# Patient Record
Sex: MALE | Race: WHITE | NOT HISPANIC OR LATINO | Employment: FULL TIME | URBAN - METROPOLITAN AREA
[De-identification: names, ages, dates, MRNs, and addresses within clinical notes are randomized per-mention and may not be internally consistent; named-entity substitution may affect disease eponyms.]

---

## 2019-05-03 ENCOUNTER — HOSPITAL ENCOUNTER (OUTPATIENT)
Dept: MEDSURG UNIT | Facility: HOSPITAL | Age: 29
End: 2019-05-04
Attending: UROLOGY | Admitting: UROLOGY

## 2019-05-03 LAB
ABS NEUT (OLG): 3.83 X10(3)/MCL (ref 2.1–9.2)
ALBUMIN SERPL-MCNC: 4.1 GM/DL (ref 3.4–5)
ALBUMIN/GLOB SERPL: 1.2 RATIO (ref 1.1–2)
ALP SERPL-CCNC: 89 UNIT/L (ref 50–136)
ALT SERPL-CCNC: 27 UNIT/L (ref 12–78)
APPEARANCE, UA: CLEAR
APTT PPP: 30.4 SECOND(S) (ref 24.2–33.9)
AST SERPL-CCNC: 20 UNIT/L (ref 15–37)
BACTERIA SPEC CULT: ABNORMAL /HPF
BASOPHILS # BLD AUTO: 0.1 X10(3)/MCL (ref 0–0.2)
BASOPHILS NFR BLD AUTO: 1 %
BILIRUB SERPL-MCNC: 0.9 MG/DL (ref 0.2–1)
BILIRUB UR QL STRIP: NEGATIVE
BILIRUBIN DIRECT+TOT PNL SERPL-MCNC: 0.1 MG/DL (ref 0–0.5)
BILIRUBIN DIRECT+TOT PNL SERPL-MCNC: 0.8 MG/DL (ref 0–0.8)
BUN SERPL-MCNC: 7 MG/DL (ref 7–18)
CALCIUM SERPL-MCNC: 9 MG/DL (ref 8.5–10.1)
CHLORIDE SERPL-SCNC: 105 MMOL/L (ref 98–107)
CO2 SERPL-SCNC: 29 MMOL/L (ref 21–32)
COLOR UR: YELLOW
CREAT SERPL-MCNC: 0.84 MG/DL (ref 0.7–1.3)
EOSINOPHIL # BLD AUTO: 0.3 X10(3)/MCL (ref 0–0.9)
EOSINOPHIL NFR BLD AUTO: 4 %
ERYTHROCYTE [DISTWIDTH] IN BLOOD BY AUTOMATED COUNT: 13.2 % (ref 11.5–17)
GLOBULIN SER-MCNC: 3.3 GM/DL (ref 2.4–3.5)
GLUCOSE (UA): NEGATIVE
GLUCOSE SERPL-MCNC: 86 MG/DL (ref 74–106)
GROUP & RH: NORMAL
HCT VFR BLD AUTO: 44.6 % (ref 42–52)
HGB BLD-MCNC: 14.6 GM/DL (ref 14–18)
HGB UR QL STRIP: ABNORMAL
INR PPP: 1.1 (ref 0–1.3)
KETONES UR QL STRIP: NEGATIVE
LACTATE SERPL-SCNC: 0.9 MMOL/L (ref 0.4–2)
LDH SERPL-CCNC: 228 UNIT/L (ref 87–241)
LEUKOCYTE ESTERASE UR QL STRIP: NEGATIVE
LYMPHOCYTES # BLD AUTO: 3 X10(3)/MCL (ref 0.6–4.6)
LYMPHOCYTES NFR BLD AUTO: 38 %
MCH RBC QN AUTO: 28.6 PG (ref 27–31)
MCHC RBC AUTO-ENTMCNC: 32.7 GM/DL (ref 33–36)
MCV RBC AUTO: 87.5 FL (ref 80–94)
MONOCYTES # BLD AUTO: 0.7 X10(3)/MCL (ref 0.1–1.3)
MONOCYTES NFR BLD AUTO: 8 %
NEUTROPHILS # BLD AUTO: 3.83 X10(3)/MCL (ref 2.1–9.2)
NEUTROPHILS NFR BLD AUTO: 48 %
NITRITE UR QL STRIP: NEGATIVE
PH UR STRIP: 6.5 [PH] (ref 5–9)
PLATELET # BLD AUTO: 216 X10(3)/MCL (ref 130–400)
PMV BLD AUTO: 11.3 FL (ref 9.4–12.4)
POTASSIUM SERPL-SCNC: 4.4 MMOL/L (ref 3.5–5.1)
PROT SERPL-MCNC: 7.4 GM/DL (ref 6.4–8.2)
PROT UR QL STRIP: NEGATIVE
PROTHROMBIN TIME: 14.1 SECOND(S) (ref 12–14)
RBC # BLD AUTO: 5.1 X10(6)/MCL (ref 4.7–6.1)
RBC #/AREA URNS HPF: ABNORMAL /[HPF]
SODIUM SERPL-SCNC: 139 MMOL/L (ref 136–145)
SP GR UR STRIP: 1.01 (ref 1–1.03)
SQUAMOUS EPITHELIAL, UA: ABNORMAL
UROBILINOGEN UR STRIP-ACNC: 0.2
WBC # SPEC AUTO: 7.9 X10(3)/MCL (ref 4.5–11.5)
WBC #/AREA URNS HPF: ABNORMAL /HPF

## 2020-07-28 ENCOUNTER — HISTORICAL (OUTPATIENT)
Dept: RADIOLOGY | Facility: HOSPITAL | Age: 30
End: 2020-07-28

## 2021-03-18 PROBLEM — M46.1 SACROILIITIS: Status: ACTIVE | Noted: 2021-03-18

## 2022-04-30 NOTE — ED PROVIDER NOTES
Patient:   Rajan Ayala            MRN: [id number]            FIN: [id number]               Age:   28 years     Sex:  Male     :  1990   Associated Diagnoses:   Left testicular pain   Author:   Matt Candelaria      Basic Information   Time seen: Date & time 5/3/2019 15:16:00.   History source: Patient.   Arrival mode: Private vehicle.   History limitation: None.   Additional information: Chief Complaint from Nursing Triage Note : Chief Complaint   5/3/2019 15:13 CDT       Chief Complaint           pt c/o left groin, testicular and pelvic pain x 1 week. denies fever. denies hematuria. denies urinary issues. pain increases during ambulation. denies trauma to area  (Modified) .   Provider/Visit info:   Time Seen:  Matt Candelaria / 2019 17:49  .   History of Present Illness   The patient presents with 29 y/o male who presents with left groin/testicular pain x 1 week. no hemtauria/dysuria. no n/v. adrien galvan and Assumed care of pt, Matt Yeboah NP-C. 28 year old  male with no significant pmhx presents with progressive left testicular pain x 1 week. Pt states on Monday it started off as a mild sharp discomfort and progressively worsened over the week. PT denies dysuria, hematuria, nausea, vomiting. Pt states he is sexually active with his wife only. .  The onset was 6  days ago.  The course/duration of symptoms is worsening.  Location: Left scrotal. The character of symptoms is pain.  The degree of pain is moderate.  The exacerbating factor is none.  The relieving factor is none.  Risk factors consist of none.  Prior episodes: none.  Therapy today: none.  Associated symptoms: none.  Additional history: none.        Review of Systems   Constitutional symptoms:  Negative except as documented in HPI.   Skin symptoms:  Negative except as documented in HPI.   Eye symptoms:  Negative except as documented in HPI.   ENMT symptoms:  Negative except as documented in HPI.   Respiratory symptoms:   Negative except as documented in HPI.   Cardiovascular symptoms:  Negative except as documented in HPI.   Gastrointestinal symptoms:  Negative except as documented in HPI.   Genitourinary symptoms:  Testicular pain.   Musculoskeletal symptoms:  Negative except as documented in HPI.   Neurologic symptoms:  Negative except as documented in HPI.   Psychiatric symptoms:  Negative except as documented in HPI.   Endocrine symptoms:  Negative except as documented in HPI.   Hematologic/Lymphatic symptoms:  Negative except as documented in HPI.   Allergy/immunologic symptoms:  Negative except as documented in HPI.             Additional review of systems information: All other systems reviewed and otherwise negative.      Health Status   Allergies:    Allergic Reactions (Selected)  No Known Medication Allergies,    Allergies (1) Active Reaction  No Known Medication Allergies None Documented.   Medications: Per nurse's notes.   Immunizations: Per nurse's notes.      Past Medical/ Family/ Social History   Medical history: Negative.   Surgical history: Negative.   Family history: Not significant.   Social history: Not significant.   Problem list:    No qualifying data available.      Physical Examination               Vital Signs             Time:  5/3/2019 17:55:00.   Vital Signs   5/3/2019 15:13 CDT       Temperature Oral          36.9 DegC                             Temperature Oral (calculated)             98.42 DegF                             Peripheral Pulse Rate     75 bpm                             Respiratory Rate          18 br/min                             SpO2                      100 %                             Oxygen Therapy            Room air                             Systolic Blood Pressure   120 mmHg                             Diastolic Blood Pressure  74 mmHg  .      No qualifying data available.   General:  Alert.   Skin:  Warm, dry, pink.    Head:  Normocephalic.   Neck:  Supple, trachea midline,  no tenderness.    Eye:  Pupils are equal, round and reactive to light, extraocular movements are intact, normal conjunctiva.    Ears, nose, mouth and throat:  Tympanic membranes clear, oral mucosa moist, no pharyngeal erythema or exudate.    Cardiovascular:  Regular rate and rhythm, No murmur, Normal peripheral perfusion.    Respiratory:  Lungs are clear to auscultation, respirations are non-labored, breath sounds are equal.    Chest wall:  No tenderness, No deformity.    Back:  Nontender, Normal range of motion.    Musculoskeletal:  Normal ROM, normal strength, no tenderness.    Gastrointestinal:  Soft, Nontender, Non distended, Normal bowel sounds.    Genitourinary:  No tenderness, normal external genitalia, no lesions, tenderness to palpation to left testicle .    Neurological:  Alert and oriented to person, place, time, and situation, No focal neurological deficit observed, CN II-XII intact, normal sensory observed, normal motor observed, normal speech observed, normal coordination observed.    Lymphatics:  No lymphadenopathy.   Psychiatric:  Cooperative, appropriate mood & affect, normal judgment.       Medical Decision Making   Differential Diagnosis:  epididymitis, testicular torsion, testicular appendix torsion, hydrocele, varicocele, .   Rationale:  refer to hpi/assessment .   Documents reviewed:  Emergency department nurses' notes.   Orders  Launch Orders   Laboratory:  UA Total a reflex to culture (Order): Stat collect, Urine, 5/3/2019 15:17 CDT, Nurse collect, Print Label By Order Location  Radiology:  US Testicle (Order): Stat, 5/3/2019 15:17 CDT, Testicular Pain, None, Stretcher, Rad Type, Schedule this test, Launch Orders   Laboratory:  LDH (Order): Stat collect, 5/3/2019 18:23 CDT, Blood, Lab Collect, Print Label By Order Location, 5/3/2019 18:23 CDT  Lactic Acid (Order): Stat collect, 5/3/2019 18:23 CDT, Blood, Lab Collect, Print Label By Order Location, 5/3/2019 18:23 CDT  Type and Ab Screen  (Order): 5/3/2019 18:22 CDT, Stat collect, Blood, Lab Collect, Packed RBC, Surgery, 0, 5/3/2019, Print Label By Order Location  Type and Rh (Order Processing)  Antibody Screen for transfusion  (Indirect Stevenson) (Order Processing)  INR - Protime (Order): Stat collect, 5/3/2019 18:22 CDT, Blood, Lab Collect, Print Label By Order Location, 5/3/2019 18:22 CDT  PTT (Order): Stat collect, 5/3/2019 18:22 CDT, Blood, Lab Collect, Print Label By Order Location, 5/3/2019 18:22 CDT  CBC w/ Auto Diff (Order): Now collect, 5/3/2019 18:22 CDT, Blood, Lab Collect, Print Label By Order Location, 5/3/2019 18:22 CDT  CMP (Order): Stat collect, 5/3/2019 18:22 CDT, Blood, Lab Collect, Print Label By Order Location, 5/3/2019 18:22 CDT, launch Order Profile (Selected)   Inpatient Orders  Canceled  CT Pelvis W Contrast: Routine, 05/03/19 11:00:00 CDT, T21.32XA, None, Ambulatory, Rad Type, Oral Contrast, Burn of third degree of abdominal wall, initial encounter, 05/03/19 11:00:00 CDT  Completed  UA Total a reflex to culture: Stat collect, Urine, 05/03/19 15:17:00 CDT, Stop date 05/03/19 15:17:00 CDT, Nurse collect, Print Label By Order Location  US Testicle: Stat, 05/03/19 15:17:00 CDT, Testicular Pain, None, Stretcher, Rad Type, Schedule this test, 05/03/19 15:17:00 CDT.    Results review:     No qualifying data available.   Radiology results:  Rad Results (ST)  < 12 hrs   Accession: [id number]  Order: US Scrotum  Report Dt/Tm: 05/03/2019 16:26  Report:   Clinical History  Testicular pain.     Technique  Doppler sonographic ultrasound of the scrotum and its contents. Images  obtained in grayscale and color.     Comparison  No prior imaging available for comparison.     Findings  RIGHT  Right testicle measures 3.3 x 2.7 x 3.8 cm.   The right epididymis measures 11 x 7 x 4 mm.  There is normal and symmetric sonographic Doppler flow throughout the  right testicle.  No testicular masses seen.      LEFT  Left testicle measures 4 x 1.3 x 3.9  cm.   The left epididymis measures 5 x 6 x 5 mm.  There is decreased vascular flow by ultrasound within the left  testicle as compared to the right.        Impression  There is decreased vascular flow within the left testicle as compared  to the right. Venous flow is noted identified.     Findings reported to Dr. Bryant in the ER.    .      Reexamination/ Reevaluation   Time: 5/3/2019 18:24:00 .   Vital signs   results included from flowsheet : Vital Signs   5/3/2019 17:56 CDT       Peripheral Pulse Rate     72 bpm                             Respiratory Rate          18 br/min                             SpO2                      100 %                             Oxygen Therapy            Room air                             Systolic Blood Pressure   122 mmHg                             Diastolic Blood Pressure  75 mmHg                             Mean Arterial Pressure, Cuff              91 mmHg    5/3/2019 15:13 CDT       Temperature Oral          36.9 DegC                             Temperature Oral (calculated)             98.42 DegF                             Peripheral Pulse Rate     75 bpm                             Respiratory Rate          18 br/min                             SpO2                      100 %                             Oxygen Therapy            Room air                             Systolic Blood Pressure   120 mmHg                             Diastolic Blood Pressure  74 mmHg     Course: improving.   Pain status: decreased.   Assessment: exam improved.   Notes: Pt reports to having some improvement of left testicular pain after being in the ED. Discussed with pt that I spoke to Dr. Solano, urology, and the pt will need to go to the OR for operative intervention. Explained to pt that based on the U/S that he has decreased flow the left testicle compared to the right. However, though this is not true testicular torsion it is unknown if this will worsen resulting in complete  obstruction of blood flow to that testicle (intermittent torsion). Pt is agreeable to the treatment plan. Dr. Solano to come assess the pt. Vitals are stable. No acute distress. .      Impression and Plan   Diagnosis   Left testicular pain ([id number]-IQ N50.812)      Calls-Consults   -  5/3/2019 18:31:00 , spoke to Dr. Solano, Urology, and plan for operative intervention for decreased blood flow to the left testicle. .    Plan   Condition: Improved, Stable.    Disposition: Admit time  5/3/2019 18:26:00, Place in Observation Unit, Russ SWENSON, Jesse MCWILLIAMS, Patient care transitioned to: Time: 5/3/2019 18:26:00, Russ SWENSON, Jesse MCWILLIAMS.

## 2022-04-30 NOTE — H&P
CHIEF COMPLAINT:  Testicular torsion.    HISTORY OF PRESENT ILLNESS:  This patient is a 28-year-old,  male who presented with acute onset left testicular pain and swelling that has been on and off since Monday, but worsening.  I was told by the ER provider that he had a scrotal ultrasound showing decreased vascular flow on the left.  When I presented to the emergency room and spoke with him, clinically, it does sound like he has been having intermittent testicular torsion over the last couple of years.  He is feeling somewhat better at this time.  Given the nature of his presentation, I do worry about intermittent torsion.  He does have a history of slowly distended testicles, which made their final descent after birth.  He has not had any significant trauma or previous surgeries.  He has no medical problems.  When I did examine him, however, he had a large inguinal bulge above the level of the testicle itself.  The left testicle does feel somewhat atrophic when compared to the right.  His cord is very edematous.  I certainly was concerned for an inguinal hernia.  I did speak with General Surgery on the phone.  We discussed some options and elected to get a CT scan, which in turn did show a large hernia.  I did try to reduce it some at that time gently but was unable.  The scan shows significant preperitoneal fat mostly, but there was some peristalsis with possible bowel segment on ultrasound.  In any event, we discussed options and elected to proceed with surgical exploration.    REVIEW OF SYSTEMS:  A 12-point review of systems negative other than in the HPI.    MEDICATIONS:  Medication admin record well documented in the chart and reviewed.    ALLERGIES:  No known drug allergies.    PAST MEDICAL HISTORY:  Denies.    PAST SURGICAL HISTORY:  Denies.    FAMILY HISTORY:  No  malignancies or stones.    SOCIAL HISTORY:  Denies any tobacco, alcohol, or drugs.  Here with his girlfriend and his  mother.    PHYSICAL EXAMINATION:  VITAL SIGNS:  Currently his vitals are stable.  He is afebrile.   GENERAL:  Well developed, well nourished, in no acute distress.   HEENT:  Normocephalic, atraumatic.  Pupils equal, round, reactive to light and accommodation.  Extraocular movements intact.  Nares patent.  Oropharynx clear.   CARDIOVASCULAR:  Regular rate and rhythm.   LUNGS:  Respirations unlabored.   ABDOMEN:  Soft, nontender, nondistended.   :  Exam shows large groin bulge consistent with left inguinal hernia.  Bilateral descended testes, left somewhat atrophic and an edematous cord on that side.  The right testicle is descended normally.  He is circumcised so with orthotopic meatus.   EXTREMITIES:  No cyanosis, clubbing, edema.   NEUROLOGIC:  Awake, alert, oriented x4.  No focal deficits.    IMAGING:  Per HPI.    LABS:  Per HPI.    ASSESSMENT:  This is a 28-year-old,  male with possible intermittent left testicular torsion and decreased vascular flow on Doppler ultrasound with concurrent left inguinal hernia.  I am unsure if this constitutes an incarceration and certainly does not constitute a strangulation at this point.    PLAN:  I did speak with Dr. Greene and he is amenable to a joint surgical approach.  Certainly we will let them make an inguinal approach to reduce the hernia and repair this.  I will then make a separate midline raphae incision and perform a left orchiectomy versus orchiopexy and a right pexy as well.  All risks,     benefits and treatment alternatives were discussed and well informed consent was obtained.  We will let him discharge to home following such.  I will have him follow up in two weeks for a postop visit.        ______________________________  MD LUDMILA Enamorado/EZEKIEL  DD:  05/03/2019  Time:  09:45PM  DT:  05/03/2019  Time:  10:00PM  Job #:  [id number]    The H&P was reviewed, the patient was examined, and the following changes to the patients condition are  noted:  ______________________________________________________________________________  ______________________________________________________________________________  ______________________________________________________________________________  [  ] No changes to the patient's condition:      ______________________________                                             ___________________  PHYSICIAN SIGNATURE                                                             DATE/TIME

## 2022-04-30 NOTE — ED PROVIDER NOTES
Patient:   Rajan Ayala            MRN: [id number]            FIN: [id number]               Age:   28 years     Sex:  Male     :  1990   Associated Diagnoses:   None   Author:   Thomas Bryant MD      Addendum      Teaching-Supervisory Addendum-Brief   I participated in the following activities of this patients care: the medical history, the physical exam, medical decision making.   I personally performed: supervision of the patient's care, the medical history, the physical exam, the medical decision making.   The case was discussed with: the nurse practitioner.   Evaluation and management service: I agree with the evaluation and management decisions made in this patient's care.   Results interpretation: I agree with the study interpretation in this patient's care.   Vital signs: Basic Oxygen Information   5/3/2019 17:56 CDT       SpO2                      100 %                             Oxygen Therapy            Room air    5/3/2019 15:13 CDT       SpO2                      100 %                             Oxygen Therapy            Room air  .   Notes: Pt seen and examined.  He presents with worsening testicular pain x 1 week.  Denies trauma.  US worrisome for compromised vascularity.  Urology to take to OR for scrotal exploration..

## 2022-04-30 NOTE — OP NOTE
DATE OF SURGERY:    05/03/2019    SURGEON:  Blue Maurice MD    PREOPERATIVE DIAGNOSIS:  Incarcerated left inguinal hernia and testicular torsion.    POSTOPERATIVE DIAGNOSIS:  Incarcerated left inguinal hernia and testicular torsion.    PROCEDURE PERFORMED:  Open left inguinal hernia repair with Bard polypropylene mesh.    COMPLICATIONS:  None.    ANESTHESIA:  General.    ESTIMATED BLOOD LOSS:  Minimal.    SPECIMENS:  Hernia sac and ilioinguinal nerve.    INDICATIONS FOR PROCEDURE:  Mr. Ayala is a 28-year-old male who presented to the hospital today after approximately a 1-week history of vague left testicular pain, underwent a testicular ultrasound in the ED which showed decreased flow but viable left testicle.  It was evaluated by Urology, who felt the patient also simultaneously had a left hernia due to a bulge.  CT scan was subsequently ordered, which showed a large left inguinal hernia containing fat.  Urology has requested us to perform a hernia repair prior to the testicular torsion, as the hernia contents were in the way of their operative field.    FINDINGS OF PROCEDURE:  The left testicle and testicular vessels were inside the hernia sac.  The left testicle was noted to be slightly smaller in size than the contralateral testicle and slightly shortened.    PROCEDURE IN DETAIL:  Patient was taken to the operating room, laid supine on the operating table.  Time-out was performed.  All were in agreement.  After appropriate anesthesia was induced, he was prepped and draped in normal sterile fashion, exposing the left groin.  Local anesthetic was used to perform a block by injecting 1 cm medial and inferior to the anterior-superior iliac spine as well as of the incision.  An incision was made sharply approximately 1 cm above and parallel to the inguinal ligament down toward the pubic tubercle.  This was carried down with electrocautery through subcutaneous tissues, Bob's fascia overlying the external  oblique.  A small incision was made in the external oblique and then Metzenbaum scissors were used to open the external oblique toward the superficial inguinal ring.  The external oblique was then grasped with hemostats and the tissues were bluntly dissected away from the cord and cord structures, which were able to be circumferentially dissected free and a Penrose drain was placed around these for retraction.  Cremasteric fibers were taken down using cautery and blunt dissection, further freeing up the cord and cord structures.  After a bit of blunt dissection with the cord, we were not able to find an adequate plane between the cord and the testicular vessels.  During retraction, the testicle and the hernia sac were brought into the operative field.  At this point, it appeared that the testicle was located inside the hernia sac.  The hernia sac was then formally opened and the testicle and cord structures were indeed lying within the hernia sac.  The mesenteric fat was then able to be reduced back into the peritoneal cavity with redundant hernia sac resected with electrocautery staying away from the cord and cord structures.  We resected redundant sac with electrocautery all the way down to the deep inguinal ring and used a 2-0 Vicryl in a pursestring fashion to close the peritoneal defect.  A Bard polypropylene mesh was cut to size and secured circumferentially starting at the pubic tubercle along the shelving edge.  A notch was cut at the shelving edge aspect for the cord and cord structures, which were placed through this and then secured with 0 Ethibond sutures.  The mesh was then secured from the pubic tubercle along the conjoined tendon and the tails of the mesh were placed under the external oblique.  The testicle was able to be palpated in the scrotum, although it appeared a little high.  It was viable at the conclusion of our portion of the procedure.  External oblique was then reapproximated over the  mesh with a running 3-0 Vicryl.  Bob's fascia was then approximated with a running 2-0 Vicryl.  The skin was closed with subcuticular 4-0 Monocryl.  Incision was cleaned and dried and then covered with Dermabond.  The patient tolerated our portion of the procedure without any issues.  I was present and scrubbed for the entire duration of the surgery.  There were no immediate postoperative complications.  For the remainder of the case with Dr. Solano with Urology, please see his dictated operative note.        ______________________________  MD DARRYL Holder IV/MANN  DD:  05/03/2019  Time:  10:57PM  DT:  05/03/2019  Time:  11:28PM  Job #:  [id number]

## 2022-04-30 NOTE — OP NOTE
DATE OF SURGERY:    05/03/2019    SURGEON:  Jesse Solano MD    PREOP DIAGNOSES:    1. Left inguinal hernia.  2. Left atrophic testis.  3. Intermittent testicular torsion.  4. Right bell clapper deformity.    POSTOP DIAGNOSES:    1. Left inguinal hernia.  2. Left atrophic testis.  3. Intermittent testicular torsion.  4. Right bell clapper deformity.    PROCEDURE:  Scrotal exploration with bilateral 3-point orchiopexy.    ANESTHESIA:  General.    ESTIMATED BLOOD LOSS:  10 cc.    COMPLICATIONS:  None.    FINDINGS:  History of left late descended testicle with left testicular atrophy and left inguinal hernia repaired by General Surgery, viable atrophic left testis, scrotal 3-point orchiopexy, right bell clapper deformity, and excision of appendix epididymis with 3-point orchiopexy.    DISPOSITION:  PACU in stable condition.    CONDITION:  Without problems.    BRIEF CLINICAL HISTORY:  This is a 28-year-old  male with abnormal presentation of left testicular pain since Monday.  On my evaluation, his clinical history was consistent with intermittent testicular torsion.  He did have an ultrasound showing decreased and paucity of vascular flow to the left testicle.  When I saw him his pain had somewhat improved.  On physical exam, he had a large groin bulge consistent with a left inguinal hernia.  We did get a CT imaging and I repeated an ultrasound at bedside with peristalsis.  I did consult General Surgery who evaluated the patient as well.  We elected for a joint procedure with left inguinal hernia repair, followed by 3-point orchiopexy bilaterally.  All risks, benefits, treatment alternatives were discussed.  Well informed consent was obtained.    PROCEDURE IN DETAIL:  On date of procedure, after appropriate consent, the patient was taken to the operating room, placed in supine position.  General endotracheal anesthesia was induced.  He was prepped and draped in usual sterile fashion.  He received  appropriate preoperative antibiotics.  A time-out was performed.  General Surgery came in and performed a left inguinal incision.  They discovered that the testicle, in fitting with history of late descent, was actually within the hernia sac and again was atrophic, but viable.  It did have an abnormal lie on this side as well.  They repaired the hernia and reduced the testicle back into the scrotum.  After the completion of their procedure, I made a midline scrotal raphae incision, dissected down through the dartos tissue, opened up the left hemiscrotum dartos, and delivered this testicle back into the field.  It, at this point, had no torsion and was viable.  It was atrophic.  We did perform a 3-point orchiopexy within the scrotal pouch with 2-0 Prolene.  We then closed the hemiscrotum with running 3-0 Vicryl.  I turned to the other side and opened up the hemiscrotum as well.  The testicle on this side had compensatory hypertrophy.  It did have a bell clapper deformity and horizontal lie.  We excised the appendix epididymis.  We then performed a 3-point orchiopexy on this side as well.  We closed the dartos in a running fashion with a 3-0 Vicryl.  We then closed the midline dartos with a running Vicryl as well.  Next, we closed the skin with a running mattress with 4-0 Monocryl.  We placed a Telfa and antibiotic dressing, scrotal fluffs and scrotal support.     The patient will follow up with me in 2 weeks for postop wound check.  He should limit any strenuous activity for 6 weeks.  He can follow up with General Surgery outpatient as well.        ______________________________  MD ROSEMARY EnamoradoB/UD  DD:  05/04/2019  Time:  07:22AM  DT:  05/04/2019  Time:  07:41AM  Job #:  [id number]

## 2022-05-04 NOTE — HISTORICAL [facility]ER
This is a historical note converted from Frank. Formatting and pictures may have been removed.  Please reference Frank for original formatting and attached multimedia. Chief Complaint  pt c/o left groin, testicular and pelvic pain x 1 week. denies fever. denies hematuria. denies urinary issues. pain increases during ambulation. denies trauma to area  Reason for Consultation  left inguinal hernia  History of Present Illness  27 yo healthy M presented to the ED with complaints of left testicular pain.? States that he has been having the pain for ~ 1year intermittently, but it worsened yesterday prompting him to go to the ED.? In the ED, US showed decreased flow to the left testicle.? He was thought to have a LIH.? This was confirmed with CT scan.? He denies any F/C/N/V.? Last po intake was yesterday at 8pm.? He did not know he had a hernia prior to today.? Denies any symptoms of incarceration or strangulation.  Review of Systems  12 point ROS negative except as stated in HPI  Physical Exam  Vitals & Measurements  T:?36.9? ?C (Oral)? HR:?72(Peripheral)? RR:?18? BP:?122/75? SpO2:?100%? WT:?68?kg?  Gen: NAD  Neuro: CN grossly intact  CV: RRR  Pulm: CTAB, no increased wob  Abd: s/ nt/nd/+ LIH  Gen: left testicle edematous  Extrem: wwp, motor and sensation intact  Assessment/Plan  27 yo M with L testicular torsion and LIH  ?  -the LIH is reducible and not strangulated, however, it is preventing detorsion of the left testicle  -To OR for open LIHR; detorsion of left testicle by urology  -R/B/A discussed with the patient and consent obtained freely  ?  Patient seen and evaluated with Dr Blue Maurice, staff surgeon.  ?  Juhi Cortesiot  General Surgery, [facility]   Problem List/Past Medical History  none  Procedure/Surgical History  none  Medications  Inpatient  Dilaudid, 1 mg= 0.5 mL, IV, q4hr, PRN  Sodium Chloride 0.9% intravenous solution 1,000 mL, 1000 mL, IV  Zofran, 4 mg= 2 mL, IV Push, q4hr, PRN  Home  No active home  medications  Allergies  No Known Medication Allergies  Social History  Tobacco  10 or more cigarettes (1/2 pack or more)/day in last 30 days, N/A, 05/03/2019  10 or more cigarettes (1/2 pack or more)/day in last 30 days, No, 03/14/2019  Family History  non-contributory  Lab Results  Labs Last 24 Hours?  ?Chemistry? Hematology/Coagulation?   Sodium Lvl: 139 mmol/L (05/03/19 18:43:00) PT:?14.1 second(s)?High (05/03/19 18:43:00)   Potassium Lvl: 4.4 mmol/L (05/03/19 18:43:00) INR: 1.1 (05/03/19 18:43:00)   Chloride: 105 mmol/L (05/03/19 18:43:00) PTT: 30.4 second(s) (05/03/19 18:43:00)   CO2: 29 mmol/L (05/03/19 18:43:00) WBC: 7.9 x10(3)/mcL (05/03/19 18:43:00)   Calcium Lvl: 9 mg/dL (05/03/19 18:43:00) RBC: 5.1 x10(6)/mcL (05/03/19 18:43:00)   Glucose Lvl: 86 mg/dL (05/03/19 18:43:00) Hgb: 14.6 gm/dL (05/03/19 18:43:00)   BUN: 7 mg/dL (05/03/19 18:43:00) Hct: 44.6 % (05/03/19 18:43:00)   Creatinine: 0.84 mg/dL (05/03/19 18:43:00) Platelet: 216 x10(3)/mcL (05/03/19 18:43:00)   eGFR-AA: >60 (05/03/19 18:43:00) MCV: 87.5 fL (05/03/19 18:43:00)   eGFR-GIOVANNI: >60 (05/03/19 18:43:00) MCH: 28.6 pg (05/03/19 18:43:00)   Bili Total: 0.9 mg/dL (05/03/19 18:43:00) MCHC:?32.7 gm/dL?Low (05/03/19 18:43:00)   Bili Direct: 0.1 mg/dL (05/03/19 18:43:00) RDW: 13.2 % (05/03/19 18:43:00)   Bili Indirect: 0.8 mg/dL (05/03/19 18:43:00) MPV: 11.3 fL (05/03/19 18:43:00)   AST: 20 unit/L (05/03/19 18:43:00) Abs Neut: 3.83 x10(3)/mcL (05/03/19 18:43:00)   ALT: 27 unit/L (05/03/19 18:43:00) Neutro Auto: 48 % (05/03/19 18:43:00)   Alk Phos: 89 unit/L (05/03/19 18:43:00) Lymph Auto: 38 % (05/03/19 18:43:00)   Total Protein: 7.4 gm/dL (05/03/19 18:43:00) Mono Auto: 8 % (05/03/19 18:43:00)   Albumin Lvl: 4.1 gm/dL (05/03/19 18:43:00) Eos Auto: 4 % (05/03/19 18:43:00)   Globulin: 3.3 gm/dL (05/03/19 18:43:00) Abs Eos: 0.3 x10(3)/mcL (05/03/19 18:43:00)   A/G Ratio: 1.2 ratio (05/03/19 18:43:00) Basophil Auto: 1 % (05/03/19 18:43:00)   LDH: 228 unit/L  (05/03/19 18:43:00) Abs Neutro: 3.83 x10(3)/mcL (05/03/19 18:43:00)   Lactic Acid Lvl: 0.9 mmol/L (05/03/19 18:47:00) Abs Lymph: 3 x10(3)/mcL (05/03/19 18:43:00)    Abs Mono: 0.7 x10(3)/mcL (05/03/19 18:43:00)    Abs Baso: 0.1 x10(3)/mcL (05/03/19 18:43:00)   ?  ?  Diagnostic Results  US:  Impression  There is decreased vascular flow within the left testicle as compared  to the right. Venous flow is noted identified.  ?  CT A/P: final read pending, left inguinal hernia on my read      Agree with above assessment and plan. Patient seen and examined with team. Discussed with Dr. Solano with urology. Concern for element of testicular torsion. However a large left inguinal hernia containing fat is present and obstructing his field and has requested us to perform a hernia repair prior to his operation. Risks/benefits explained to the patient and he consented to the surgery.

## 2022-11-17 ENCOUNTER — OFFICE VISIT (OUTPATIENT)
Dept: URGENT CARE | Facility: CLINIC | Age: 32
End: 2022-11-17
Payer: MEDICAID

## 2022-11-17 VITALS
RESPIRATION RATE: 18 BRPM | HEART RATE: 61 BPM | OXYGEN SATURATION: 100 % | WEIGHT: 186.19 LBS | HEIGHT: 72 IN | DIASTOLIC BLOOD PRESSURE: 82 MMHG | TEMPERATURE: 98 F | BODY MASS INDEX: 25.22 KG/M2 | SYSTOLIC BLOOD PRESSURE: 125 MMHG

## 2022-11-17 DIAGNOSIS — R68.89 FLU-LIKE SYMPTOMS: ICD-10-CM

## 2022-11-17 DIAGNOSIS — J06.9 ACUTE URI: Primary | ICD-10-CM

## 2022-11-17 DIAGNOSIS — Z11.52 ENCOUNTER FOR SCREENING FOR SEVERE ACUTE RESPIRATORY SYNDROME CORONAVIRUS 2 (SARS-COV-2) INFECTION: ICD-10-CM

## 2022-11-17 LAB
CTP QC/QA: YES
CTP QC/QA: YES
FLUAV AG NPH QL: NEGATIVE
FLUBV AG NPH QL: NEGATIVE
SARS-COV-2 RDRP RESP QL NAA+PROBE: NEGATIVE

## 2022-11-17 PROCEDURE — 87804 INFLUENZA ASSAY W/OPTIC: CPT | Mod: PBBFAC | Performed by: NURSE PRACTITIONER

## 2022-11-17 PROCEDURE — 87635 SARS-COV-2 COVID-19 AMP PRB: CPT | Mod: PBBFAC | Performed by: NURSE PRACTITIONER

## 2022-11-17 PROCEDURE — 99214 OFFICE O/P EST MOD 30 MIN: CPT | Mod: PBBFAC | Performed by: NURSE PRACTITIONER

## 2022-11-17 PROCEDURE — 99213 PR OFFICE/OUTPT VISIT, EST, LEVL III, 20-29 MIN: ICD-10-PCS | Mod: S$PBB,,, | Performed by: NURSE PRACTITIONER

## 2022-11-17 PROCEDURE — 99213 OFFICE O/P EST LOW 20 MIN: CPT | Mod: S$PBB,,, | Performed by: NURSE PRACTITIONER

## 2022-11-17 RX ORDER — DEXAMETHASONE SODIUM PHOSPHATE 100 MG/10ML
8 INJECTION INTRAMUSCULAR; INTRAVENOUS
Status: COMPLETED | OUTPATIENT
Start: 2022-11-17 | End: 2022-11-17

## 2022-11-17 RX ORDER — PROMETHAZINE HYDROCHLORIDE AND DEXTROMETHORPHAN HYDROBROMIDE 6.25; 15 MG/5ML; MG/5ML
5 SYRUP ORAL EVERY 6 HOURS PRN
Qty: 100 ML | Refills: 0 | OUTPATIENT
Start: 2022-11-17 | End: 2024-02-08

## 2022-11-17 RX ORDER — LEVOCETIRIZINE DIHYDROCHLORIDE 5 MG/1
5 TABLET, FILM COATED ORAL NIGHTLY
Qty: 14 TABLET | Refills: 0 | OUTPATIENT
Start: 2022-11-17 | End: 2024-02-08

## 2022-11-17 RX ORDER — FLUTICASONE PROPIONATE 50 MCG
2 SPRAY, SUSPENSION (ML) NASAL DAILY
Qty: 16 G | Refills: 0 | OUTPATIENT
Start: 2022-11-17 | End: 2024-02-08

## 2022-11-17 RX ADMIN — DEXAMETHASONE SODIUM PHOSPHATE 8 MG: 100 INJECTION INTRAMUSCULAR; INTRAVENOUS at 12:11

## 2022-11-17 NOTE — LETTER
November 17, 2022      [facility]  [address] [address]  Phone: [phone]       Patient: Rajan Ayala   YOB: 1990  Date of Visit: 11/17/2022    To Whom It May Concern:    Char Ayala  was at [facility] on 11/17/2022. The patient may return to work/school on 11/22/2022 with no restrictions. If you have any questions or concerns, or if I can be of further assistance, please do not hesitate to contact me.    Sincerely,    Marcelo Ballard, NP

## 2022-11-17 NOTE — PROGRESS NOTES
Subjective:       Patient ID: Rajan Ayala is a 32 y.o. male.    Vitals:  height is 6' (1.829 m) and weight is 84.5 kg (186 lb 3.2 oz). His oral temperature is 98.2 °F (36.8 °C). His blood pressure is 125/82 and his pulse is 61. His respiration is 18 and oxygen saturation is 100%.     Chief Complaint: Weakness (xTuesday), Cough (xTuesday), Headache (xTuesday), and Dizziness (xTuesday)    Patient is a 32-year-old male, here today for cough, congestion, headache, dizziness, fatigue that started on Tuesday.  Taking DayQuil and NyQuil for symptoms.      Constitution: Positive for fatigue.   HENT:  Positive for congestion and sore throat.    Neck: neck negative.   Cardiovascular: Negative.    Respiratory:  Positive for cough.      Objective:      Physical Exam   Constitutional: He is oriented to person, place, and time. He appears well-developed.   HENT:   Head: Normocephalic.   Ears:   Right Ear: Tympanic membrane normal.   Left Ear: Tympanic membrane normal.   Nose: Congestion present.   Mouth/Throat: Oropharynx is clear.   Eyes: Conjunctivae and EOM are normal. Pupils are equal, round, and reactive to light.   Neck: Neck supple.   Cardiovascular: Normal rate, regular rhythm and normal heart sounds.   Pulmonary/Chest: Effort normal and breath sounds normal.   Musculoskeletal: Normal range of motion.         General: Normal range of motion.   Neurological: He is alert and oriented to person, place, and time.   Skin: Skin is warm and dry.   Psychiatric: His behavior is normal.   Vitals reviewed.      Assessment:       1. Acute URI    2. Encounter for screening for severe acute respiratory syndrome coronavirus 2 (SARS-CoV-2) infection    3. Flu-like symptoms              Office Visit on 11/17/2022   Component Date Value Ref Range Status    POC Rapid COVID 11/17/2022 Negative  Negative Final     Acceptable 11/17/2022 Yes   Final    Rapid Influenza A Ag 11/17/2022 Negative  Negative Final    Rapid Influenza B  Ag 11/17/2022 Negative  Negative Final     Acceptable 11/17/2022 Yes   Final        No results found.   Plan:         Decadron 8mg IM in office.   Medication as ordered. May use humidifier.  If any shortness of breath, wheezing, continued fevers or any new symptoms then immediately go to ER.    Acute URI  -     dexAMETHasone injection 8 mg  -     levocetirizine (XYZAL) 5 MG tablet; Take 1 tablet (5 mg total) by mouth every evening. for 14 days  Dispense: 14 tablet; Refill: 0  -     fluticasone propionate (FLONASE) 50 mcg/actuation nasal spray; 2 sprays (100 mcg total) by Each Nostril route once daily.  Dispense: 16 g; Refill: 0  -     promethazine-dextromethorphan (PROMETHAZINE-DM) 6.25-15 mg/5 mL Syrp; Take 5 mLs by mouth every 6 (six) hours as needed (cough).  Dispense: 100 mL; Refill: 0    Encounter for screening for severe acute respiratory syndrome coronavirus 2 (SARS-CoV-2) infection  -     POCT COVID-19 Rapid Screening    Flu-like symptoms  -     POCT Influenza A/B

## 2023-01-17 ENCOUNTER — HOSPITAL ENCOUNTER (EMERGENCY)
Facility: HOSPITAL | Age: 33
Discharge: HOME | End: 2023-01-17
Attending: INTERNAL MEDICINE
Payer: MEDICAID

## 2023-01-17 VITALS
OXYGEN SATURATION: 100 % | DIASTOLIC BLOOD PRESSURE: 79 MMHG | HEIGHT: 73 IN | BODY MASS INDEX: 23.09 KG/M2 | TEMPERATURE: 98 F | WEIGHT: 174.19 LBS | HEART RATE: 71 BPM | RESPIRATION RATE: 16 BRPM | SYSTOLIC BLOOD PRESSURE: 128 MMHG

## 2023-01-17 DIAGNOSIS — M25.512 ACUTE PAIN OF LEFT SHOULDER: Primary | ICD-10-CM

## 2023-01-17 DIAGNOSIS — S46.812A TRAPEZIUS MUSCLE STRAIN, LEFT, INITIAL ENCOUNTER: ICD-10-CM

## 2023-01-17 PROCEDURE — 63600175 PHARM REV CODE 636 W HCPCS: Performed by: PHYSICIAN ASSISTANT

## 2023-01-17 PROCEDURE — 96372 THER/PROPH/DIAG INJ SC/IM: CPT | Performed by: PHYSICIAN ASSISTANT

## 2023-01-17 PROCEDURE — 99284 EMERGENCY DEPT VISIT MOD MDM: CPT

## 2023-01-17 RX ORDER — DICLOFENAC SODIUM 75 MG/1
75 TABLET, DELAYED RELEASE ORAL 2 TIMES DAILY PRN
Qty: 20 TABLET | Refills: 0 | OUTPATIENT
Start: 2023-01-17 | End: 2024-02-08

## 2023-01-17 RX ORDER — CYCLOBENZAPRINE HCL 10 MG
10 TABLET ORAL 3 TIMES DAILY PRN
Qty: 30 TABLET | Refills: 0 | Status: SHIPPED | OUTPATIENT
Start: 2023-01-17 | End: 2023-01-27

## 2023-01-17 RX ORDER — KETOROLAC TROMETHAMINE 30 MG/ML
15 INJECTION, SOLUTION INTRAMUSCULAR; INTRAVENOUS
Status: COMPLETED | OUTPATIENT
Start: 2023-01-17 | End: 2023-01-17

## 2023-01-17 RX ADMIN — KETOROLAC TROMETHAMINE 15 MG: 30 INJECTION, SOLUTION INTRAMUSCULAR; INTRAVENOUS at 11:01

## 2023-01-17 NOTE — ED PROVIDER NOTES
Encounter Date: 1/17/2023       History     Chief Complaint   Patient presents with    Shoulder Pain     Left shoulder pain with burning pain into pectoral muscle and down the arm with movement.     Rajan Ayala is a 32 y.o. male with a history of chronic back pain who presents to the ED complaining of left shoulder pain x 1 day. Patient reports being really busy at work yesterday where he was repeatedly lifting boxes above his head. When he got home he noticed a burning pain that starts in his left neck/shoulder and radiates down to his elbow. He takes norco for his chronic back pain but reports minimal relief so he wanted to have it checked out. He denies fall, trauma, or injury. No numbness/tingling in arm, headache, neck pain.     The history is provided by the patient. No  was used.   Review of patient's allergies indicates:  No Known Allergies  History reviewed. No pertinent past medical history.  Past Surgical History:   Procedure Laterality Date    EPIDURAL STEROID INJECTION INTO LUMBAR SPINE      FUSION OF SACROILIAC JOINT Right 4/13/2021    Procedure: FUSION, SACROILIAC JOINT;  Surgeon: Thomas Quiles Jr., MD;  [facility] OR;  Service: Orthopedics;  Laterality: Right;  7:30 per Elroy    HERNIA REPAIR       Family History   Problem Relation Age of Onset    Diabetes Mother     Hypertension Mother     Hypertension Father      Social History     Tobacco Use    Smoking status: Every Day     Packs/day: 1.00     Years: 10.00     Pack years: 10.00     Types: Cigarettes    Smokeless tobacco: Never   Substance Use Topics    Alcohol use: Yes     Comment: rare    Drug use: Never     Review of Systems   Constitutional:  Negative for activity change, chills and fever.   HENT:  Negative for congestion and trouble swallowing.    Eyes:  Negative for photophobia and visual disturbance.   Respiratory:  Negative for chest tightness, shortness of breath and wheezing.    Cardiovascular:  Negative  for chest pain, palpitations and leg swelling.   Gastrointestinal:  Negative for abdominal pain, constipation, diarrhea, nausea and vomiting.   Genitourinary:  Negative for dysuria, frequency, hematuria and urgency.   Musculoskeletal:  Positive for arthralgias, back pain (chronic) and myalgias. Negative for gait problem, joint swelling and neck pain.   Skin:  Negative for color change and rash.   Neurological:  Negative for dizziness, syncope, weakness, light-headedness, numbness and headaches.   Psychiatric/Behavioral:  Negative for agitation and confusion. The patient is not nervous/anxious.      Physical Exam     Initial Vitals [01/17/23 1010]   BP Pulse Resp Temp SpO2   139/78 69 16 98.3 °F (36.8 °C) 99 %      MAP       --         Physical Exam    Nursing note and vitals reviewed.  Constitutional: He appears well-developed and well-nourished. No distress.   HENT:   Head: Normocephalic and atraumatic.   Mouth/Throat: No oropharyngeal exudate.   Eyes: EOM are normal. No scleral icterus.   Neck: Neck supple.   Normal range of motion.  Cardiovascular:  Normal rate and regular rhythm.           No murmur heard.  Pulmonary/Chest: No respiratory distress. He has no wheezes.   Abdominal: Abdomen is soft. He exhibits no distension. There is no abdominal tenderness.   Musculoskeletal:         General: Tenderness (along left trapezius muscle) present. No edema. Normal range of motion.      Cervical back: Normal range of motion and neck supple.      Comments: No bony tenderness of L shoulder or cervical spine. Full ROM, however pt reports pain with flexion/extension of shoulder.      Neurological: He is alert and oriented to person, place, and time. No cranial nerve deficit.   Skin: Skin is warm and dry. Capillary refill takes less than 2 seconds. No erythema.   Psychiatric: He has a normal mood and affect. Thought content normal.       ED Course   Procedures  Labs Reviewed - No data to display       Imaging Results               X-Ray Shoulder 2 or More Views Left (Final result)  Result time 01/17/23 11:40:18      Final result by Doug Flowers MD (01/17/23 11:40:18)                   Impression:      No acute osseous abnormality identified.      Electronically signed by: Doug Flowers  Date:    01/17/2023  Time:    11:40               Narrative:    EXAMINATION:  XR SHOULDER COMPLETE 2 OR MORE VIEWS LEFT    CLINICAL HISTORY:  left shoulder pain;    TECHNIQUE:  Three views    COMPARISON:  Three.    FINDINGS:  Articular and osseous structures are unremarkable.  There is no acute fracture, dislocation or osteoarthritic change.  Alignment and position is unremarkable.  There is unremarkable demineralization of the bones.  No soft tissue calcifications identified.                                       Medications   ketorolac injection 15 mg (15 mg Intramuscular Given 1/17/23 1113)     Medical Decision Making:   Clinical Tests:   Radiological Study: Ordered and Reviewed  ED Management:  The patient is resting comfortably and in no acute distress.  He states that his symptoms have improved after treatment in Emergency Department. I personally discussed his test results and treatment plan.  Gave strict ED precautions and specific conditions for return to the emergency department and importance of follow up with pcp.  Patient voices understanding and agrees to the plan discussed. All patients' questions have been answered at this time. He has remained hemodynamically stable throughout entire stay in ED and is stable for discharge home.                        Clinical Impression:   Final diagnoses:  [M25.512] Acute pain of left shoulder (Primary)  [S46.812A] Trapezius muscle strain, left, initial encounter        ED Disposition Condition    Discharge Stable          ED Prescriptions       Medication Sig Dispense Start Date End Date Auth. Provider    cyclobenzaprine (FLEXERIL) 10 MG tablet Take 1 tablet (10 mg total) by mouth 3 (three) times  daily as needed for Muscle spasms. 30 tablet 1/17/2023 1/27/2023 Chana Soto PA-C    diclofenac (VOLTAREN) 75 MG EC tablet Take 1 tablet (75 mg total) by mouth 2 (two) times daily as needed (pain). 20 tablet 1/17/2023 -- Chana Soto PA-C          Follow-up Information       Follow up With Specialties Details Why Contact Info    [facility] Emergency Medicine  If symptoms worsen [address] [address] [phone]    PCP  In 3 days Hospital follow up              Chana Soto PA-C  01/17/23 [id number]

## 2023-01-17 NOTE — Clinical Note
"Rajan"De Ayala was seen and treated in our emergency department on 1/17/2023.  He may return to work on 01/18/2023.       If you have any questions or concerns, please don't hesitate to call.       LPN    "

## 2024-02-08 ENCOUNTER — HOSPITAL ENCOUNTER (EMERGENCY)
Facility: HOSPITAL | Age: 34
Discharge: HOME | End: 2024-02-08
Attending: EMERGENCY MEDICINE
Payer: MEDICAID

## 2024-02-08 VITALS
RESPIRATION RATE: 16 BRPM | TEMPERATURE: 98 F | OXYGEN SATURATION: 97 % | BODY MASS INDEX: 24.14 KG/M2 | HEART RATE: 83 BPM | DIASTOLIC BLOOD PRESSURE: 82 MMHG | SYSTOLIC BLOOD PRESSURE: 135 MMHG | WEIGHT: 183 LBS

## 2024-02-08 DIAGNOSIS — B34.9 VIRAL SYNDROME: Primary | ICD-10-CM

## 2024-02-08 LAB
FLUAV AG UPPER RESP QL IA.RAPID: NOT DETECTED
FLUBV AG UPPER RESP QL IA.RAPID: NOT DETECTED
SARS-COV-2 RNA RESP QL NAA+PROBE: NOT DETECTED

## 2024-02-08 PROCEDURE — 99284 EMERGENCY DEPT VISIT MOD MDM: CPT

## 2024-02-08 PROCEDURE — 0240U COVID/FLU A&B PCR: CPT | Performed by: PHYSICIAN ASSISTANT

## 2024-02-08 RX ORDER — PROMETHAZINE HYDROCHLORIDE AND DEXTROMETHORPHAN HYDROBROMIDE 6.25; 15 MG/5ML; MG/5ML
5 SYRUP ORAL EVERY 6 HOURS PRN
Qty: 100 ML | Refills: 0 | Status: SHIPPED | OUTPATIENT
Start: 2024-02-08 | End: 2024-02-13

## 2024-02-08 RX ORDER — IBUPROFEN 800 MG/1
800 TABLET ORAL EVERY 6 HOURS PRN
Qty: 20 TABLET | Refills: 0 | Status: SHIPPED | OUTPATIENT
Start: 2024-02-08 | End: 2024-05-08

## 2024-02-08 RX ORDER — METHYLPREDNISOLONE 4 MG/1
TABLET ORAL
Qty: 21 TABLET | Refills: 0 | Status: SHIPPED | OUTPATIENT
Start: 2024-02-08 | End: 2024-05-08

## 2024-02-08 NOTE — Clinical Note
"Rajan"De Ayala was seen and treated in our emergency department on 2/8/2024.  He may return to work on 02/10/2024.       If you have any questions or concerns, please don't hesitate to call.      Piotr Marcano PA"

## 2024-02-08 NOTE — DISCHARGE INSTRUCTIONS
Report to Emergency Department if symptoms return or worsen; [facility] Within 1 to 2 days, It is important that you follow up with your primary care provider or specialist if indicated for further evaluation, workup, and treatment as necessary. The exam and treatment you received in Emergency Department was for an urgent problem and NOT INTENDED AS COMPLETE CARE. It is important that you FOLLOW UP with a doctor for ongoing care. If your symptoms become WORSE or you DO NOT IMPROVE and you are unable to reach your health care provider, you should RETURN to the Emergency Department. The Emergency Department provider has provided a PRELIMINARY INTERPRETATION of all your studies. A final interpretation may be done after you are discharged. If a change in your diagnosis or treatment is needed WE WILL CONTACT YOU. It is critical that we have a CURRENT PHONE NUMBER FOR YOU.

## 2024-02-08 NOTE — ED PROVIDER NOTES
Encounter Date: 2/8/2024       History     Chief Complaint   Patient presents with    Cough     CO COUGH, BODY ACHES, SINUS CONGESTION X 2 DAYS.       33-year-old male with past medical history significant for chronic low back pain and smoking presents to ED complaining of 2 day history of dry cough, congestion, fatigue, body aches.  Denies known sick contacts, but does report that he works at [employer] and is therefore in frequent contact with the public.  Reports that he has been taking DayQuil with moderate, but transient relief of symptoms.  Denies fever, chills, shortness of breath, wheezing, hemoptysis, headache, dizziness, chest pain, sore throat.  Vital signs stable on arrival, patient in no acute distress.      Review of patient's allergies indicates:  No Known Allergies  History reviewed. No pertinent past medical history.  Past Surgical History:   Procedure Laterality Date    EPIDURAL STEROID INJECTION INTO LUMBAR SPINE      FUSION OF SACROILIAC JOINT Right 4/13/2021    Procedure: FUSION, SACROILIAC JOINT;  Surgeon: Thomas Quiles Jr., MD;  Location: [facility] OR;  Service: Orthopedics;  Laterality: Right;  7:30 per Elroy    HERNIA REPAIR       Family History   Problem Relation Age of Onset    Diabetes Mother     Hypertension Mother     Hypertension Father      Social History     Tobacco Use    Smoking status: Every Day     Current packs/day: 1.00     Average packs/day: 1 pack/day for 10.0 years (10.0 ttl pk-yrs)     Types: Cigarettes    Smokeless tobacco: Never   Substance Use Topics    Alcohol use: Yes     Comment: rare    Drug use: Never     Review of Systems   All other systems reviewed and are negative.      Physical Exam     Initial Vitals [02/08/24 0839]   BP Pulse Resp Temp SpO2   135/82 83 16 97.9 °F (36.6 °C) 97 %      MAP       --         Physical Exam    Nursing note and vitals reviewed.  Constitutional: He appears well-developed and well-nourished. He is not diaphoretic. No distress.    HENT:   Head: Normocephalic and atraumatic.   Right Ear: Hearing, tympanic membrane, external ear and ear canal normal.   Left Ear: Hearing, tympanic membrane, external ear and ear canal normal.   Nose: Mucosal edema and rhinorrhea present. Right sinus exhibits no maxillary sinus tenderness and no frontal sinus tenderness. Left sinus exhibits no maxillary sinus tenderness and no frontal sinus tenderness.   Mouth/Throat: Uvula is midline, oropharynx is clear and moist and mucous membranes are normal. No trismus in the jaw. No uvula swelling. No oropharyngeal exudate, posterior oropharyngeal edema or posterior oropharyngeal erythema.   Eyes: Conjunctivae and EOM are normal. Pupils are equal, round, and reactive to light.   Neck: Neck supple.   Normal range of motion.  Cardiovascular:  Normal rate, regular rhythm, normal heart sounds and intact distal pulses.     Exam reveals no gallop and no friction rub.       No murmur heard.  Pulmonary/Chest: Breath sounds normal. No respiratory distress. He has no wheezes. He has no rhonchi. He has no rales.   Abdominal: Abdomen is soft. Bowel sounds are normal. He exhibits no distension. There is no abdominal tenderness. There is no rebound and no guarding.   Musculoskeletal:         General: Normal range of motion.      Cervical back: Normal range of motion and neck supple.     Neurological: He is alert and oriented to person, place, and time. No cranial nerve deficit or sensory deficit. GCS score is 15. GCS eye subscore is 4. GCS verbal subscore is 5. GCS motor subscore is 6.   Skin: Skin is warm and dry. Capillary refill takes less than 2 seconds. No pallor.   Psychiatric: He has a normal mood and affect.         ED Course   Procedures  Labs Reviewed   COVID/FLU A&B PCR - Normal    Narrative:     The Xpert Xpress SARS-CoV-2/FLU/RSV plus is a rapid, multiplexed real-time PCR test intended for the simultaneous qualitative detection and differentiation of SARS-CoV-2, Influenza  A, Influenza B, and respiratory syncytial virus (RSV) viral RNA in either nasopharyngeal swab or nasal swab specimens.                Imaging Results    None          Medications - No data to display  Medical Decision Making  Differential diagnosis: Includes but not limited to COVID, flu, other viral illness    ED management:  No indication for acute medical intervention in ED at this time.      ED course:  COVID and flu swabs negative.  Lungs clear to auscultation in all fields with no signs of respiratory distress, no indication for chest x-ray at this time.  Patient is nontoxic appearing with unremarkable vitals, stable for discharge.  I will send patient home with medications for symptom relief.  Referral placed to Internal Medicine Clinic for follow-up.  Strict ED precautions given for new or worsening symptoms and patient verbalized understanding.  All test results explained and all questions answered.    Amount and/or Complexity of Data Reviewed  Labs: ordered. Decision-making details documented in ED Course.                                      Clinical Impression:  Final diagnoses:  [B34.9] Viral syndrome (Primary)          ED Disposition Condition    Discharge Good          ED Prescriptions       Medication Sig Dispense Start Date End Date Auth. Provider    methylPREDNISolone (MEDROL DOSEPACK) 4 mg tablet Take all tablets as directed on packaging 21 tablet 2/8/2024 -- Piotr Marcano PA    promethazine-dextromethorphan (PROMETHAZINE-DM) 6.25-15 mg/5 mL Syrp Take 5 mLs by mouth every 6 (six) hours as needed (cough). 100 mL 2/8/2024 2/13/2024 Piotr Marcano PA    ibuprofen (ADVIL,MOTRIN) 800 MG tablet Take 1 tablet (800 mg total) by mouth every 6 (six) hours as needed for Pain. 20 tablet 2/8/2024 -- Piotr Marcano PA          Follow-up Information       Follow up With Specialties Details Why Contact Info Additional Information    [facility] Internal Medicine Internal Medicine In 2 weeks  [address] [facility] [id number] [phone] Internal Medicine Clinic Entrance #1    [facility] - Emergency Dept Emergency Medicine  As needed, If symptoms worsen [address] [facility] [address] [phone]              Piotr Marcano PA  02/08/24 1008

## 2024-05-08 ENCOUNTER — OFFICE VISIT (OUTPATIENT)
Dept: INTERNAL MEDICINE | Facility: CLINIC | Age: 34
End: 2024-05-08
Payer: MEDICAID

## 2024-05-08 VITALS
RESPIRATION RATE: 16 BRPM | HEART RATE: 62 BPM | WEIGHT: 190.06 LBS | SYSTOLIC BLOOD PRESSURE: 118 MMHG | HEIGHT: 73 IN | BODY MASS INDEX: 25.19 KG/M2 | DIASTOLIC BLOOD PRESSURE: 74 MMHG | TEMPERATURE: 98 F

## 2024-05-08 DIAGNOSIS — M54.50 CHRONIC LOW BACK PAIN, UNSPECIFIED BACK PAIN LATERALITY, UNSPECIFIED WHETHER SCIATICA PRESENT: ICD-10-CM

## 2024-05-08 DIAGNOSIS — Z11.3 SCREEN FOR STD (SEXUALLY TRANSMITTED DISEASE): ICD-10-CM

## 2024-05-08 DIAGNOSIS — F17.210 CIGARETTE NICOTINE DEPENDENCE WITHOUT COMPLICATION: ICD-10-CM

## 2024-05-08 DIAGNOSIS — Z00.00 WELLNESS EXAMINATION: ICD-10-CM

## 2024-05-08 DIAGNOSIS — G89.29 CHRONIC LOW BACK PAIN, UNSPECIFIED BACK PAIN LATERALITY, UNSPECIFIED WHETHER SCIATICA PRESENT: ICD-10-CM

## 2024-05-08 PROCEDURE — 3008F BODY MASS INDEX DOCD: CPT | Mod: CPTII,,, | Performed by: NURSE PRACTITIONER

## 2024-05-08 PROCEDURE — 99215 OFFICE O/P EST HI 40 MIN: CPT | Mod: PBBFAC | Performed by: NURSE PRACTITIONER

## 2024-05-08 PROCEDURE — 99214 OFFICE O/P EST MOD 30 MIN: CPT | Mod: S$PBB,25,, | Performed by: NURSE PRACTITIONER

## 2024-05-08 PROCEDURE — 3078F DIAST BP <80 MM HG: CPT | Mod: CPTII,,, | Performed by: NURSE PRACTITIONER

## 2024-05-08 PROCEDURE — 3074F SYST BP LT 130 MM HG: CPT | Mod: CPTII,,, | Performed by: NURSE PRACTITIONER

## 2024-05-08 PROCEDURE — 1159F MED LIST DOCD IN RCRD: CPT | Mod: CPTII,,, | Performed by: NURSE PRACTITIONER

## 2024-05-08 PROCEDURE — 1160F RVW MEDS BY RX/DR IN RCRD: CPT | Mod: CPTII,,, | Performed by: NURSE PRACTITIONER

## 2024-05-08 PROCEDURE — 99406 BEHAV CHNG SMOKING 3-10 MIN: CPT | Mod: S$PBB,,, | Performed by: NURSE PRACTITIONER

## 2024-05-08 RX ORDER — TIZANIDINE 2 MG/1
2 TABLET ORAL EVERY 12 HOURS PRN
Qty: 45 TABLET | Refills: 0 | Status: SHIPPED | OUTPATIENT
Start: 2024-05-08 | End: 2024-05-28

## 2024-05-09 ENCOUNTER — LAB VISIT (OUTPATIENT)
Dept: LAB | Facility: HOSPITAL | Age: 34
End: 2024-05-09
Attending: NURSE PRACTITIONER
Payer: MEDICAID

## 2024-05-09 DIAGNOSIS — Z00.00 WELLNESS EXAMINATION: ICD-10-CM

## 2024-05-09 DIAGNOSIS — Z11.3 SCREEN FOR STD (SEXUALLY TRANSMITTED DISEASE): ICD-10-CM

## 2024-05-09 LAB
25(OH)D3+25(OH)D2 SERPL-MCNC: 29 NG/ML (ref 30–80)
ALBUMIN SERPL-MCNC: 3.9 G/DL (ref 3.5–5)
ALBUMIN/GLOB SERPL: 1.1 RATIO (ref 1.1–2)
ALP SERPL-CCNC: 94 UNIT/L (ref 40–150)
ALT SERPL-CCNC: 32 UNIT/L (ref 0–55)
AMPHET UR QL SCN: NEGATIVE
AST SERPL-CCNC: 25 UNIT/L (ref 5–34)
BACTERIA #/AREA URNS AUTO: ABNORMAL /HPF
BARBITURATE SCN PRESENT UR: NEGATIVE
BASOPHILS # BLD AUTO: 0.06 X10(3)/MCL
BASOPHILS NFR BLD AUTO: 0.7 %
BENZODIAZ UR QL SCN: NEGATIVE
BILIRUB SERPL-MCNC: 0.7 MG/DL
BILIRUB UR QL STRIP.AUTO: NEGATIVE
BUN SERPL-MCNC: 8.5 MG/DL (ref 8.9–20.6)
CALCIUM SERPL-MCNC: 9.5 MG/DL (ref 8.4–10.2)
CANNABINOIDS UR QL SCN: NEGATIVE
CHLORIDE SERPL-SCNC: 106 MMOL/L (ref 98–107)
CHOLEST SERPL-MCNC: 218 MG/DL
CHOLEST/HDLC SERPL: 6 {RATIO} (ref 0–5)
CLARITY UR: CLEAR
CO2 SERPL-SCNC: 26 MMOL/L (ref 22–29)
COCAINE UR QL SCN: NEGATIVE
COLOR UR AUTO: ABNORMAL
CREAT SERPL-MCNC: 0.86 MG/DL (ref 0.73–1.18)
EOSINOPHIL # BLD AUTO: 0.34 X10(3)/MCL (ref 0–0.9)
EOSINOPHIL NFR BLD AUTO: 4.2 %
ERYTHROCYTE [DISTWIDTH] IN BLOOD BY AUTOMATED COUNT: 13.5 % (ref 11.5–17)
FENTANYL UR QL SCN: NEGATIVE
GFR SERPLBLD CREATININE-BSD FMLA CKD-EPI: >60 ML/MIN/1.73/M2
GLOBULIN SER-MCNC: 3.6 GM/DL (ref 2.4–3.5)
GLUCOSE SERPL-MCNC: 141 MG/DL (ref 74–100)
GLUCOSE UR QL STRIP: NORMAL
HAV IGM SERPL QL IA: NONREACTIVE
HBV CORE IGM SERPL QL IA: NONREACTIVE
HBV SURFACE AG SERPL QL IA: NONREACTIVE
HCT VFR BLD AUTO: 44.8 % (ref 42–52)
HCV AB SERPL QL IA: NONREACTIVE
HDLC SERPL-MCNC: 38 MG/DL (ref 35–60)
HGB BLD-MCNC: 15.1 G/DL (ref 14–18)
HGB UR QL STRIP: ABNORMAL
HIV 1+2 AB+HIV1 P24 AG SERPL QL IA: NONREACTIVE
HYALINE CASTS #/AREA URNS LPF: ABNORMAL /LPF
IMM GRANULOCYTES # BLD AUTO: 0.03 X10(3)/MCL (ref 0–0.04)
IMM GRANULOCYTES NFR BLD AUTO: 0.4 %
KETONES UR QL STRIP: NEGATIVE
LDLC SERPL CALC-MCNC: 154 MG/DL (ref 50–140)
LEUKOCYTE ESTERASE UR QL STRIP: NEGATIVE
LYMPHOCYTES # BLD AUTO: 2.96 X10(3)/MCL (ref 0.6–4.6)
LYMPHOCYTES NFR BLD AUTO: 36.8 %
MCH RBC QN AUTO: 29 PG (ref 27–31)
MCHC RBC AUTO-ENTMCNC: 33.7 G/DL (ref 33–36)
MCV RBC AUTO: 86.2 FL (ref 80–94)
MDMA UR QL SCN: NEGATIVE
MONOCYTES # BLD AUTO: 0.65 X10(3)/MCL (ref 0.1–1.3)
MONOCYTES NFR BLD AUTO: 8.1 %
MUCOUS THREADS URNS QL MICRO: ABNORMAL /LPF
NEUTROPHILS # BLD AUTO: 4 X10(3)/MCL (ref 2.1–9.2)
NEUTROPHILS NFR BLD AUTO: 49.8 %
NITRITE UR QL STRIP: NEGATIVE
NRBC BLD AUTO-RTO: 0 %
OPIATES UR QL SCN: NEGATIVE
PCP UR QL: NEGATIVE
PH UR STRIP: 6.5 [PH]
PH UR: 6.5 [PH] (ref 3–11)
PLATELET # BLD AUTO: 273 X10(3)/MCL (ref 130–400)
PMV BLD AUTO: 10.6 FL (ref 7.4–10.4)
POTASSIUM SERPL-SCNC: 4.5 MMOL/L (ref 3.5–5.1)
PROT SERPL-MCNC: 7.5 GM/DL (ref 6.4–8.3)
PROT UR QL STRIP: NEGATIVE
RBC # BLD AUTO: 5.2 X10(6)/MCL (ref 4.7–6.1)
RBC #/AREA URNS AUTO: ABNORMAL /HPF
SODIUM SERPL-SCNC: 138 MMOL/L (ref 136–145)
SP GR UR STRIP.AUTO: 1.01 (ref 1–1.03)
SPECIFIC GRAVITY, URINE AUTO (.000) (OHS): 1.01 (ref 1–1.03)
SQUAMOUS #/AREA URNS LPF: ABNORMAL /HPF
T PALLIDUM AB SER QL: NONREACTIVE
TRIGL SERPL-MCNC: 128 MG/DL (ref 34–140)
TSH SERPL-ACNC: 0.93 UIU/ML (ref 0.35–4.94)
UROBILINOGEN UR STRIP-ACNC: NORMAL
VLDLC SERPL CALC-MCNC: 26 MG/DL
WBC # SPEC AUTO: 8.04 X10(3)/MCL (ref 4.5–11.5)
WBC #/AREA URNS AUTO: ABNORMAL /HPF

## 2024-05-09 PROCEDURE — 82306 VITAMIN D 25 HYDROXY: CPT

## 2024-05-09 PROCEDURE — 80053 COMPREHEN METABOLIC PANEL: CPT

## 2024-05-09 PROCEDURE — 87389 HIV-1 AG W/HIV-1&-2 AB AG IA: CPT

## 2024-05-09 PROCEDURE — 81001 URINALYSIS AUTO W/SCOPE: CPT | Mod: XB

## 2024-05-09 PROCEDURE — 85025 COMPLETE CBC W/AUTO DIFF WBC: CPT

## 2024-05-09 PROCEDURE — 80307 DRUG TEST PRSMV CHEM ANLYZR: CPT

## 2024-05-09 PROCEDURE — 84443 ASSAY THYROID STIM HORMONE: CPT

## 2024-05-09 PROCEDURE — 36415 COLL VENOUS BLD VENIPUNCTURE: CPT

## 2024-05-09 PROCEDURE — 86780 TREPONEMA PALLIDUM: CPT

## 2024-05-09 PROCEDURE — 80074 ACUTE HEPATITIS PANEL: CPT

## 2024-05-09 PROCEDURE — 80061 LIPID PANEL: CPT

## 2024-05-12 NOTE — PROGRESS NOTES
"Internal Medicine Clinic  GLENDY Jordan     Patient Name: Rajan Ayala   : 1990  MRN:[id number]     Chief Complaint     Chief Complaint   Patient presents with    Establish [facility]     Wellness  Hx of Chronic back pain        History of Present Illness     33 year old white male, presents in clinic to establish care. C/o chronic low back pain worse to right side. Voices right leg weakness. Denies sensation of numbness/tingling.  PMH chronic back pain, tobacco use, BMI 25. 1ppd, started smoking age 18. History of torsion, hernia repair 2019. Work related injury; working as a  in [employer]. SI repair 23.  Denies chest pain, shortness of breath, cough, fever, headache, dizziness, weakness, abdominal pain, nausea, vomiting, diarrhea, constipation, dysuria, depression, anxiety. Unemployed, 3 kids.           Review of Systems     Review of Systems   Constitutional: Negative.    HENT: Negative.     Eyes: Negative.    Respiratory: Negative.     Cardiovascular: Negative.    Gastrointestinal: Negative.    Endocrine: Negative.    Genitourinary: Negative.    Musculoskeletal:  Positive for back pain.   Integumentary:  Negative.   Allergic/Immunologic: Negative.    Neurological: Negative.    Hematological: Negative.    Psychiatric/Behavioral: Negative.     All other systems reviewed and are negative.       Physical Examination     Visit Vitals  /74 (BP Location: Left arm, Patient Position: Sitting, BP Method: Medium (Automatic))   Pulse 62   Temp 97.5 °F (36.4 °C) (Oral)   Resp 16   Ht 6' 1" (1.854 m)   Wt 86.2 kg (190 lb 0.6 oz)   BMI 25.07 kg/m²        BP Readings from Last 6 Encounters:   24 118/74   24 135/82   23 128/79   22 125/82   21 (!) 120/58   21 122/75   ]    Wt Readings from Last 6 Encounters:   24 86.2 kg (190 lb 0.6 oz)   24 83 kg (182 lb 15.7 oz)   23 79 kg (174 lb 2.6 oz)   22 84.5 kg (186 lb 3.2 oz)   21 75.3 kg (166 " lb)   04/01/21 75.3 kg (166 lb)   ]    BMI Readings from Last 3 Encounters:   05/08/24 25.07 kg/m²   02/08/24 24.14 kg/m²   01/17/23 22.98 kg/m²         Physical Exam  Vitals and nursing note reviewed.   Constitutional:       Appearance: Normal appearance.   HENT:      Head: Normocephalic.      Right Ear: Tympanic membrane, ear canal and external ear normal.      Left Ear: Tympanic membrane, ear canal and external ear normal.      Nose: Nose normal.      Mouth/Throat:      Mouth: Mucous membranes are moist.      Pharynx: Oropharynx is clear.   Eyes:      Extraocular Movements: Extraocular movements intact.      Conjunctiva/sclera: Conjunctivae normal.      Pupils: Pupils are equal, round, and reactive to light.   Cardiovascular:      Rate and Rhythm: Normal rate and regular rhythm.      Pulses: Normal pulses.      Heart sounds: Normal heart sounds.   Pulmonary:      Effort: Pulmonary effort is normal.      Breath sounds: Normal breath sounds.   Abdominal:      General: Bowel sounds are normal.      Palpations: Abdomen is soft.   Musculoskeletal:         General: Tenderness present. Normal range of motion.      Cervical back: Normal range of motion and neck supple.      Comments: Lspine   Skin:     General: Skin is warm and dry.      Capillary Refill: Capillary refill takes less than 2 seconds.   Neurological:      General: No focal deficit present.      Mental Status: He is alert and oriented to person, place, and time. Mental status is at baseline.   Psychiatric:         Mood and Affect: Mood normal.         Behavior: Behavior normal.         Thought Content: Thought content normal.         Judgment: Judgment normal.          Labs / Imaging     Chemistry:  Lab Results   Component Value Date     05/09/2024     04/14/2021    K 4.5 05/09/2024    K 4.1 04/14/2021    CHLORIDE 106 05/09/2024    BUN 8.5 (L) 05/09/2024    BUN 7 (L) 04/14/2021    CREATININE 0.86 05/09/2024    CREATININE 0.85 04/14/2021     "EGFRNORACEVR >60 05/09/2024    GLUCOSE 141 (H) 05/09/2024    CALCIUM 9.5 05/09/2024    CALCIUM 8.9 04/14/2021    ALKPHOS 94 05/09/2024    ALKPHOS 63 04/14/2021    LABPROT 7.5 05/09/2024    ALBUMIN 3.9 05/09/2024    ALBUMIN 3.7 04/14/2021    BILIDIR 0.10 05/03/2019    IBILI 0.80 05/03/2019    AST 25 05/09/2024    AST 50 04/14/2021    ALT 32 05/09/2024    ALT 23 04/14/2021    [id number] 29 (L) 05/09/2024        No results found for: "HGBA1C", "MICROALBCREA"     Hematology:  Lab Results   Component Value Date    WBC 8.04 05/09/2024    WBC 8.70 04/14/2021    RBC 5.20 05/09/2024    RBC 4.42 (L) 04/14/2021    HGB 15.1 05/09/2024    HGB 13.3 (L) 04/14/2021    HCT 44.8 05/09/2024    HCT 39.1 (L) 04/14/2021    MCV 86.2 05/09/2024    MCV 88 04/14/2021    MCH 29.0 05/09/2024    MCH 30.0 04/14/2021    MCHC 33.7 05/09/2024    MCHC 33.9 04/14/2021    RDW 13.5 05/09/2024    RDW 13.4 04/14/2021     05/09/2024     04/14/2021    MPV 10.6 (H) 05/09/2024    MPV 9.9 04/14/2021    GRAN 5.6 04/14/2021    GRAN 64.0 04/14/2021    LYMPH 1.8 04/14/2021    LYMPH 20.7 04/14/2021    MONO 1.0 04/14/2021    MONO 11.3 04/14/2021    EOS 0.3 04/14/2021    BASO 0.10 04/14/2021    EOSINOPHIL 3.1 04/14/2021    BASOPHIL 0.9 04/14/2021        Lipid Panel:  Lab Results   Component Value Date    CHOL 218 (H) 05/09/2024    HDL 38 05/09/2024    .00 (H) 05/09/2024    TRIG 128 05/09/2024    TOTALCHOLEST 6 (H) 05/09/2024        Urine:  Lab Results   Component Value Date    COLORUA Light-Yellow 05/09/2024    APPEARANCEUA Clear 05/09/2024    SGUA 1.015 05/09/2024    PHUA 6.5 05/09/2024    PROTEINUA Negative 05/09/2024    GLUCOSEUA Normal 05/09/2024    KETONESUA Negative 05/09/2024    BLOODUA 1+ (A) 05/09/2024    NITRITESUA Negative 05/09/2024    LEUKOCYTESUR Negative 05/09/2024    RBCUA 0-5 05/09/2024    WBCUA None Seen 05/09/2024    BACTERIA Trace (A) 05/09/2024    SQEPUA None Seen 05/09/2024    HYALINECASTS None Seen 05/09/2024    "       Assessment       ICD-10-CM ICD-9-CM   1. Chronic low back pain, unspecified back pain laterality, unspecified whether sciatica present  M54.50 724.2    G89.29 338.29   2. Wellness examination  Z00.00 V70.0   3. Screen for STD (sexually transmitted disease)  Z11.3 V74.5   4. Cigarette nicotine dependence without complication  F17.210 305.1   5. BMI 25.0-25.9,adult  Z68.25 V85.21        Plan   1. Chronic low back pain, unspecified back pain laterality, unspecified whether sciatica present  RX tizanidine prn  L spine xray ordered  Lower back stretches daily.  Avoid strenuous lifting, use proper body mechanics.  Heating pad, Ice pack, Biofreeze or Epsom salt baths as needed.  Exercise to strengthen core muscles to support your back.  Consider PT referral  - Ambulatory referral/consult to Internal Medicine  - X-Ray Lumbar Spine 5 View; Future    2. Wellness examination    - CBC Auto Differential; Future  - Comprehensive Metabolic Panel; Future  - Lipid Panel; Future  - TSH; Future  - Urinalysis; Future  - Vitamin D; Future  - HIV 1/2 Ag/Ab (4th Gen); Future  - SYPHILIS ANTIBODY (WITH REFLEX RPR); Future  - Chlamydia/GC, PCR; Future  - Hepatitis Panel, Acute; Future  - Drug Screen, Urine; Future    3. Screen for STD (sexually transmitted disease)    - HIV 1/2 Ag/Ab (4th Gen); Future  - SYPHILIS ANTIBODY (WITH REFLEX RPR); Future  - Chlamydia/GC, PCR; Future  - Hepatitis Panel, Acute; Future  - Drug Screen, Urine; Future    4. Cigarette nicotine dependence without complication  Smoking cessation advised. Instructed on smoking cessation program through [facility] and pharmacological interventions to aid in cessation.  >5 minutes allotted to educate patient on the harms of smoking, the urgency to quit, and the development of a plan for smoking cessation.   - Ambulatory referral/consult to Smoking Cessation Program; Future    5. BMI 25.0-25.9,adult  Goal BMI <30.  Exercise 5 times a week for 30 minutes per day.  Avoid soda,  simple sugars, excessive rice, potatoes or bread. Limit fast foods and fried foods.  Choose complex carbs in moderation (example: green vegetables, beans, oatmeal). Eat plenty of fresh fruits and vegetables with lean meats daily.  Do not skip meals. Eat a balanced portion size.  Avoid fad diets. Consider permanent healthy life style changes.         Current Outpatient Medications   Medication Instructions    tiZANidine (ZANAFLEX) 2 mg, Oral, Every 12 hours PRN         Orders Placed This Encounter   Procedures    Chlamydia/GC, PCR    X-Ray Lumbar Spine 5 View    CBC Auto Differential    Comprehensive Metabolic Panel    Lipid Panel    TSH    Urinalysis    Vitamin D    HIV 1/2 Ag/Ab (4th Gen)    SYPHILIS ANTIBODY (WITH REFLEX RPR)    Hepatitis Panel, Acute    Drug Screen, Urine    Ambulatory referral/consult to Smoking Cessation Program         Future Appointments   Date Time Provider Department Center   6/6/2024  1:40 PM Rianna Dow FNP [facility]        Follow up in about 4 weeks (around 6/5/2024) for lab review.    Labs thoroughly reviewed with patient. Medication refills addressed today.  RTC prn and 4 wks, with labs 1 week prior to the apt.  COVID 19 precautions given to patient.  Patient voices understanding of all discharge instructions.      GLENDY Jordan

## 2024-05-15 ENCOUNTER — HOSPITAL ENCOUNTER (OUTPATIENT)
Dept: RADIOLOGY | Facility: HOSPITAL | Age: 34
Discharge: HOME | End: 2024-05-15
Attending: NURSE PRACTITIONER
Payer: MEDICAID

## 2024-05-15 DIAGNOSIS — M54.50 CHRONIC LOW BACK PAIN, UNSPECIFIED BACK PAIN LATERALITY, UNSPECIFIED WHETHER SCIATICA PRESENT: ICD-10-CM

## 2024-05-15 DIAGNOSIS — G89.29 CHRONIC LOW BACK PAIN, UNSPECIFIED BACK PAIN LATERALITY, UNSPECIFIED WHETHER SCIATICA PRESENT: ICD-10-CM

## 2024-05-15 PROCEDURE — 72110 X-RAY EXAM L-2 SPINE 4/>VWS: CPT | Mod: TC

## 2024-05-28 RX ORDER — TIZANIDINE 2 MG/1
TABLET ORAL
Qty: 45 TABLET | Refills: 0 | Status: SHIPPED | OUTPATIENT
Start: 2024-05-28

## 2024-06-06 ENCOUNTER — OFFICE VISIT (OUTPATIENT)
Dept: INTERNAL MEDICINE | Facility: CLINIC | Age: 34
End: 2024-06-06
Payer: MEDICAID

## 2024-06-06 VITALS
SYSTOLIC BLOOD PRESSURE: 109 MMHG | BODY MASS INDEX: 25.33 KG/M2 | HEIGHT: 73 IN | TEMPERATURE: 98 F | HEART RATE: 69 BPM | WEIGHT: 191.13 LBS | RESPIRATION RATE: 16 BRPM | DIASTOLIC BLOOD PRESSURE: 71 MMHG

## 2024-06-06 DIAGNOSIS — M54.50 CHRONIC LOW BACK PAIN, UNSPECIFIED BACK PAIN LATERALITY, UNSPECIFIED WHETHER SCIATICA PRESENT: ICD-10-CM

## 2024-06-06 DIAGNOSIS — E78.5 HYPERLIPIDEMIA, UNSPECIFIED HYPERLIPIDEMIA TYPE: ICD-10-CM

## 2024-06-06 DIAGNOSIS — R31.9 HEMATURIA, UNSPECIFIED TYPE: ICD-10-CM

## 2024-06-06 DIAGNOSIS — E55.9 VITAMIN D DEFICIENCY: ICD-10-CM

## 2024-06-06 DIAGNOSIS — R73.9 HYPERGLYCEMIA: ICD-10-CM

## 2024-06-06 DIAGNOSIS — G89.29 CHRONIC LOW BACK PAIN, UNSPECIFIED BACK PAIN LATERALITY, UNSPECIFIED WHETHER SCIATICA PRESENT: ICD-10-CM

## 2024-06-06 DIAGNOSIS — F17.210 CIGARETTE NICOTINE DEPENDENCE WITHOUT COMPLICATION: ICD-10-CM

## 2024-06-06 LAB — HBA1C MFR BLD: 5.6 %

## 2024-06-06 PROCEDURE — 3044F HG A1C LEVEL LT 7.0%: CPT | Mod: CPTII,,, | Performed by: NURSE PRACTITIONER

## 2024-06-06 PROCEDURE — 99214 OFFICE O/P EST MOD 30 MIN: CPT | Mod: S$PBB,25,, | Performed by: NURSE PRACTITIONER

## 2024-06-06 PROCEDURE — 99406 BEHAV CHNG SMOKING 3-10 MIN: CPT | Mod: S$PBB,,, | Performed by: NURSE PRACTITIONER

## 2024-06-06 PROCEDURE — 3008F BODY MASS INDEX DOCD: CPT | Mod: CPTII,,, | Performed by: NURSE PRACTITIONER

## 2024-06-06 PROCEDURE — 3074F SYST BP LT 130 MM HG: CPT | Mod: CPTII,,, | Performed by: NURSE PRACTITIONER

## 2024-06-06 PROCEDURE — 3078F DIAST BP <80 MM HG: CPT | Mod: CPTII,,, | Performed by: NURSE PRACTITIONER

## 2024-06-06 PROCEDURE — 1160F RVW MEDS BY RX/DR IN RCRD: CPT | Mod: CPTII,,, | Performed by: NURSE PRACTITIONER

## 2024-06-06 PROCEDURE — 99214 OFFICE O/P EST MOD 30 MIN: CPT | Mod: PBBFAC | Performed by: NURSE PRACTITIONER

## 2024-06-06 PROCEDURE — 1159F MED LIST DOCD IN RCRD: CPT | Mod: CPTII,,, | Performed by: NURSE PRACTITIONER

## 2024-06-06 PROCEDURE — 83036 HEMOGLOBIN GLYCOSYLATED A1C: CPT | Mod: PBBFAC | Performed by: NURSE PRACTITIONER

## 2024-06-06 RX ORDER — ASPIRIN 325 MG
50000 TABLET, DELAYED RELEASE (ENTERIC COATED) ORAL
Qty: 12 CAPSULE | Refills: 0 | Status: SHIPPED | OUTPATIENT
Start: 2024-06-06

## 2024-06-06 RX ORDER — DICLOFENAC SODIUM 75 MG/1
75 TABLET, DELAYED RELEASE ORAL 2 TIMES DAILY
Qty: 60 TABLET | Refills: 3 | Status: SHIPPED | OUTPATIENT
Start: 2024-06-06

## 2024-06-06 NOTE — PROGRESS NOTES
"[facility]  GLENDY Jordan     Patient Name: Rajan Ayala   : 1990  MRN:[id number]     Chief Complaint     Chief Complaint   Patient presents with    Follow-up     Lab/Xray review        History of Present Illness     33 year old white male, presents in clinic for lab f/u. C/o chronic low back pain worse to right side. Voices right leg weakness. Denies sensation of numbness/tingling. Xray lumbar spine shows Minimal degenerative changes. Fasting Glucose is elevated today at 141 H. Mother of pt is diabetic. POC A1C is 5.6.  PMH chronic back pain, tobacco use, BMI 25. 1ppd, started smoking age 18. History of torsion, hernia repair 2019. Work related injury; working as a  in [employer]. SI repair 23.  Denies chest pain, shortness of breath, cough, fever, headache, dizziness, weakness, abdominal pain, nausea, vomiting, diarrhea, constipation, dysuria, depression, anxiety. Unemployed, 3 kids.             Review of Systems     Review of Systems   Constitutional: Negative.    HENT: Negative.     Eyes: Negative.    Respiratory: Negative.     Cardiovascular: Negative.    Gastrointestinal: Negative.    Endocrine: Negative.    Genitourinary: Negative.    Musculoskeletal:  Positive for back pain.   Integumentary:  Negative.   Allergic/Immunologic: Negative.    Neurological: Negative.    Hematological: Negative.    Psychiatric/Behavioral: Negative.     All other systems reviewed and are negative.       Physical Examination     Visit Vitals  /71 (BP Location: Left arm, Patient Position: Sitting, BP Method: Medium (Automatic))   Pulse 69   Temp 98 °F (36.7 °C) (Oral)   Resp 16   Ht 6' 1" (1.854 m)   Wt 86.7 kg (191 lb 2.2 oz)   BMI 25.22 kg/m²        BP Readings from Last 6 Encounters:   24 109/71   24 118/74   24 135/82   23 128/79   22 125/82   21 (!) 120/58   ]    Wt Readings from Last 6 Encounters:   24 86.7 kg (191 lb 2.2 oz)   24 86.2 " kg (190 lb 0.6 oz)   02/08/24 83 kg (182 lb 15.7 oz)   01/17/23 79 kg (174 lb 2.6 oz)   11/17/22 84.5 kg (186 lb 3.2 oz)   04/13/21 75.3 kg (166 lb)   ]    BMI Readings from Last 3 Encounters:   06/06/24 25.22 kg/m²   05/08/24 25.07 kg/m²   02/08/24 24.14 kg/m²         Physical Exam  Vitals and nursing note reviewed.   Constitutional:       Appearance: Normal appearance.   HENT:      Head: Normocephalic.      Right Ear: Tympanic membrane, ear canal and external ear normal.      Left Ear: Tympanic membrane, ear canal and external ear normal.      Nose: Nose normal.      Mouth/Throat:      Mouth: Mucous membranes are moist.      Pharynx: Oropharynx is clear.   Eyes:      Extraocular Movements: Extraocular movements intact.      Conjunctiva/sclera: Conjunctivae normal.      Pupils: Pupils are equal, round, and reactive to light.   Cardiovascular:      Rate and Rhythm: Normal rate and regular rhythm.      Pulses: Normal pulses.      Heart sounds: Normal heart sounds.   Pulmonary:      Effort: Pulmonary effort is normal.      Breath sounds: Normal breath sounds.   Abdominal:      General: Bowel sounds are normal.      Palpations: Abdomen is soft.   Musculoskeletal:         General: Tenderness present. Normal range of motion.      Cervical back: Normal range of motion and neck supple.      Comments: Lspine   Skin:     General: Skin is warm and dry.      Capillary Refill: Capillary refill takes less than 2 seconds.   Neurological:      General: No focal deficit present.      Mental Status: He is alert and oriented to person, place, and time. Mental status is at baseline.   Psychiatric:         Mood and Affect: Mood normal.         Behavior: Behavior normal.         Thought Content: Thought content normal.         Judgment: Judgment normal.          Labs / Imaging     Chemistry:  Lab Results   Component Value Date     05/09/2024     04/14/2021    K 4.5 05/09/2024    K 4.1 04/14/2021    BUN 8.5 (L) 05/09/2024     "BUN 7 (L) 04/14/2021    CREATININE 0.86 05/09/2024    CREATININE 0.85 04/14/2021    EGFRNORACEVR >60 05/09/2024    GLUCOSE 141 (H) 05/09/2024    CALCIUM 9.5 05/09/2024    CALCIUM 8.9 04/14/2021    ALKPHOS 94 05/09/2024    ALKPHOS 63 04/14/2021    LABPROT 7.5 05/09/2024    ALBUMIN 3.9 05/09/2024    ALBUMIN 3.7 04/14/2021    BILIDIR 0.10 05/03/2019    IBILI 0.80 05/03/2019    AST 25 05/09/2024    AST 50 04/14/2021    ALT 32 05/09/2024    ALT 23 04/14/2021    [id number] 29 (L) 05/09/2024        No results found for: "HGBA1C", "MICROALBCREA"     Hematology:  Lab Results   Component Value Date    WBC 8.04 05/09/2024    WBC 8.70 04/14/2021    RBC 5.20 05/09/2024    RBC 4.42 (L) 04/14/2021    HGB 15.1 05/09/2024    HGB 13.3 (L) 04/14/2021    HCT 44.8 05/09/2024    HCT 39.1 (L) 04/14/2021    MCV 86.2 05/09/2024    MCV 88 04/14/2021    MCH 29.0 05/09/2024    MCH 30.0 04/14/2021    MCHC 33.7 05/09/2024    MCHC 33.9 04/14/2021    RDW 13.5 05/09/2024    RDW 13.4 04/14/2021     05/09/2024     04/14/2021    MPV 10.6 (H) 05/09/2024    MPV 9.9 04/14/2021    GRAN 5.6 04/14/2021    GRAN 64.0 04/14/2021    LYMPH 1.8 04/14/2021    LYMPH 20.7 04/14/2021    MONO 1.0 04/14/2021    MONO 11.3 04/14/2021    EOS 0.3 04/14/2021    BASO 0.10 04/14/2021    EOSINOPHIL 3.1 04/14/2021    BASOPHIL 0.9 04/14/2021        Lipid Panel:  Lab Results   Component Value Date    CHOL 218 (H) 05/09/2024    HDL 38 05/09/2024    .00 (H) 05/09/2024    TRIG 128 05/09/2024    TOTALCHOLEST 6 (H) 05/09/2024        Urine:  Lab Results   Component Value Date    APPEARANCEUA Clear 05/09/2024    SGUA 1.015 05/09/2024    PROTEINUA Negative 05/09/2024    KETONESUA Negative 05/09/2024    LEUKOCYTESUR Negative 05/09/2024    RBCUA 0-5 05/09/2024    WBCUA None Seen 05/09/2024    BACTERIA Trace (A) 05/09/2024    SQEPUA None Seen 05/09/2024    HYALINECASTS None Seen 05/09/2024      COMPLETE LUMBAR SPINE SERIES WITH OBLIQUE VIEWS:     CPT: 06722   "   CLINICAL HISTORY:  Low back pain, unspecified     FINDINGS:  The other  lumbar vertebral body heights and alignment of the other  lumbar vertebra in the AP plane are well maintained. No acute fractures or subluxations are identified.   The other  intervertebral disc heights are maintained.  There are minimal degenerative changes of the posterior elements at L5-S1.     Orthopedic devices are seen between the right iliac wing and right edith sacrum     Impression:     1. No acute fractures or subluxations are identified..     Minimal degenerative changes        Electronically signed by:Oswaldo Donahue  Date:                                            05/15/2024    Assessment       ICD-10-CM ICD-9-CM   1. Chronic low back pain, unspecified back pain laterality, unspecified whether sciatica present  M54.50 724.2    G89.29 338.29   2. Hyperlipidemia, unspecified hyperlipidemia type  E78.5 272.4   3. Hyperglycemia  R73.9 790.29   4. Cigarette nicotine dependence without complication  F17.210 305.1   5. Vitamin D deficiency  E55.9 268.9   6. BMI 25.0-25.9,adult  Z68.25 V85.21   7. Hematuria, unspecified type  R31.9 599.70        Plan     1. Chronic low back pain, unspecified back pain laterality, unspecified whether sciatica present  RX diclofenac bid, continue tizanidine prn  Lower back stretches daily.  Avoid strenuous lifting, use proper body mechanics.  Heating pad, Ice pack, Biofreeze or Epsom salt baths as needed.  Exercise to strengthen core muscles to support your back.  Referral to [facility] physical therapy  - Ambulatory referral/consult to Physical/Occupational Therapy; Future    2. Hyperlipidemia, unspecified hyperlipidemia type  Lab Results   Component Value Date    .00 (H) 05/09/2024    CHOL 218 (H) 05/09/2024    HDL 38 05/09/2024    TRIG 128 05/09/2024       Cont RX daily; refills given. Take Omega 3 daily.   Stressed importance of dietary modifications. Follow a low cholesterol, low saturated fat diet  with less that 200mg of cholesterol a day.  Avoid fried foods and high saturated fats (high saturated fats less than 7% of calories).  Add Flax Seed/Fish Oil supplements to diet. Increase dietary fiber.  Regular exercise can reduce LDL and raise HDL. Stressed importance of physical activity 5 times per week for 30 minutes per day.    - CBC Auto Differential; Future  - Comprehensive Metabolic Panel; Future  - Lipid Panel; Future  - Hemoglobin A1C; Future    3. Hyperglycemia  POC A1C 5.6, close f/u  Maintain healthy weight or lose weight.   Eat fewer refined carbohydrates and fats, and more fiber.  Read nutrition labels.  Reduce portion sizes.  Eat out less often. Avoid fast foods.  Drink water and unsweetened beverages.  Spending at least one hour every day in physical activity.   - POCT HEMOGLOBIN A1C  - CBC Auto Differential; Future  - Comprehensive Metabolic Panel; Future  - Lipid Panel; Future  - Hemoglobin A1C; Future    4. Cigarette nicotine dependence without complication  Smoking cessation advised. Instructed on smoking cessation program through [facility] and pharmacological interventions to aid in cessation.  >5 minutes allotted to educate patient on the harms of smoking, the urgency to quit, and the development of a plan for smoking cessation.     5. Vitamin D deficiency  Vitamin D level is low. Will start patient on a prescription of vitamin D3 20660 IU once a week for 12 weeks. Once this prescription is completed, patient advised to purchase OTC vitamin D3 2000 IU and take this once a day thereafter or unless notified otherwise. Repeat Vitamin D level at follow up.   - Vitamin D; Future    6. BMI 25.0-25.9,adult  Goal BMI <30.  Exercise 5 times a week for 30 minutes per day.  Avoid soda, simple sugars, excessive rice, potatoes or bread. Limit fast foods and fried foods.  Choose complex carbs in moderation (example: green vegetables, beans, oatmeal). Eat plenty of fresh fruits and vegetables with lean meats  daily.  Do not skip meals. Eat a balanced portion size.  Avoid fad diets. Consider permanent healthy life style changes.       7. Hematuria, unspecified type  Close f/u.  Repeat UA at f/u  Denies LUTS  Consider CT abd/pelvis  - Urinalysis; Future        Current Outpatient Medications   Medication Instructions    cholecalciferol (vitamin D3) 50,000 Units, Oral, Every 7 days    diclofenac (VOLTAREN) 75 mg, Oral, 2 times daily    tiZANidine (ZANAFLEX) 2 MG tablet TAKE 1 TABLET(2 MG) BY MOUTH EVERY 12 HOURS AS NEEDED FOR MUSCLE SPASM       Orders Placed This Encounter   Procedures    CBC Auto Differential    Comprehensive Metabolic Panel    Lipid Panel    Hemoglobin A1C    Vitamin D    Urinalysis    Ambulatory referral/consult to Physical/Occupational Therapy    POCT HEMOGLOBIN A1C         Future Appointments   Date Time Provider Department Center   9/10/2024  1:20 PM Rianna Dow FNP [facility]        Follow up in about 3 months (around 9/6/2024) for lab review.    Labs thoroughly reviewed with patient. Medication refills addressed today.  RTC prn and 3 months, with labs 1 week prior to the apt.  COVID 19 precautions given to patient.  Patient voices understanding of all discharge instructions.      GLENDY Jordan

## 2024-07-01 RX ORDER — TIZANIDINE 2 MG/1
TABLET ORAL
Qty: 45 TABLET | Refills: 2 | Status: SHIPPED | OUTPATIENT
Start: 2024-07-01

## 2024-09-10 ENCOUNTER — LAB VISIT (OUTPATIENT)
Dept: LAB | Facility: HOSPITAL | Age: 34
End: 2024-09-10
Attending: NURSE PRACTITIONER
Payer: MEDICAID

## 2024-09-10 ENCOUNTER — TELEPHONE (OUTPATIENT)
Dept: INTERNAL MEDICINE | Facility: CLINIC | Age: 34
End: 2024-09-10
Payer: MEDICAID

## 2024-09-10 ENCOUNTER — OFFICE VISIT (OUTPATIENT)
Dept: INTERNAL MEDICINE | Facility: CLINIC | Age: 34
End: 2024-09-10
Payer: MEDICAID

## 2024-09-10 VITALS
HEIGHT: 73 IN | WEIGHT: 198 LBS | BODY MASS INDEX: 26.24 KG/M2 | SYSTOLIC BLOOD PRESSURE: 111 MMHG | TEMPERATURE: 98 F | DIASTOLIC BLOOD PRESSURE: 72 MMHG | RESPIRATION RATE: 16 BRPM | HEART RATE: 62 BPM

## 2024-09-10 DIAGNOSIS — F17.210 CIGARETTE NICOTINE DEPENDENCE WITHOUT COMPLICATION: ICD-10-CM

## 2024-09-10 DIAGNOSIS — R73.9 HYPERGLYCEMIA: ICD-10-CM

## 2024-09-10 DIAGNOSIS — M54.9 DORSALGIA, UNSPECIFIED: ICD-10-CM

## 2024-09-10 DIAGNOSIS — E55.9 VITAMIN D DEFICIENCY: ICD-10-CM

## 2024-09-10 DIAGNOSIS — E78.5 HYPERLIPIDEMIA, UNSPECIFIED HYPERLIPIDEMIA TYPE: ICD-10-CM

## 2024-09-10 LAB
25(OH)D3+25(OH)D2 SERPL-MCNC: 58 NG/ML (ref 30–80)
ALBUMIN SERPL-MCNC: 3.9 G/DL (ref 3.5–5)
ALBUMIN/GLOB SERPL: 1.1 RATIO (ref 1.1–2)
ALP SERPL-CCNC: 106 UNIT/L (ref 40–150)
ALT SERPL-CCNC: 14 UNIT/L (ref 0–55)
ANION GAP SERPL CALC-SCNC: 6 MEQ/L
AST SERPL-CCNC: 22 UNIT/L (ref 5–34)
BASOPHILS # BLD AUTO: 0.05 X10(3)/MCL
BASOPHILS NFR BLD AUTO: 0.5 %
BILIRUB SERPL-MCNC: 0.4 MG/DL
BUN SERPL-MCNC: 9.8 MG/DL (ref 8.9–20.6)
CALCIUM SERPL-MCNC: 9.3 MG/DL (ref 8.4–10.2)
CHLORIDE SERPL-SCNC: 108 MMOL/L (ref 98–107)
CHOLEST SERPL-MCNC: 196 MG/DL
CHOLEST/HDLC SERPL: 7 {RATIO} (ref 0–5)
CO2 SERPL-SCNC: 25 MMOL/L (ref 22–29)
CREAT SERPL-MCNC: 0.98 MG/DL (ref 0.73–1.18)
CREAT/UREA NIT SERPL: 10
EOSINOPHIL # BLD AUTO: 0.28 X10(3)/MCL (ref 0–0.9)
EOSINOPHIL NFR BLD AUTO: 2.8 %
ERYTHROCYTE [DISTWIDTH] IN BLOOD BY AUTOMATED COUNT: 13.2 % (ref 11.5–17)
EST. AVERAGE GLUCOSE BLD GHB EST-MCNC: 105.4 MG/DL
GFR SERPLBLD CREATININE-BSD FMLA CKD-EPI: >60 ML/MIN/1.73/M2
GLOBULIN SER-MCNC: 3.6 GM/DL (ref 2.4–3.5)
GLUCOSE SERPL-MCNC: 104 MG/DL (ref 74–100)
HBA1C MFR BLD: 5.3 %
HCT VFR BLD AUTO: 43.7 % (ref 42–52)
HDLC SERPL-MCNC: 29 MG/DL (ref 35–60)
HGB BLD-MCNC: 14.8 G/DL (ref 14–18)
IMM GRANULOCYTES # BLD AUTO: 0.03 X10(3)/MCL (ref 0–0.04)
IMM GRANULOCYTES NFR BLD AUTO: 0.3 %
LDLC SERPL CALC-MCNC: 141 MG/DL (ref 50–140)
LYMPHOCYTES # BLD AUTO: 3.36 X10(3)/MCL (ref 0.6–4.6)
LYMPHOCYTES NFR BLD AUTO: 33.8 %
MCH RBC QN AUTO: 29.1 PG (ref 27–31)
MCHC RBC AUTO-ENTMCNC: 33.9 G/DL (ref 33–36)
MCV RBC AUTO: 86 FL (ref 80–94)
MONOCYTES # BLD AUTO: 0.95 X10(3)/MCL (ref 0.1–1.3)
MONOCYTES NFR BLD AUTO: 9.6 %
NEUTROPHILS # BLD AUTO: 5.27 X10(3)/MCL (ref 2.1–9.2)
NEUTROPHILS NFR BLD AUTO: 53 %
NRBC BLD AUTO-RTO: 0 %
PLATELET # BLD AUTO: 241 X10(3)/MCL (ref 130–400)
PMV BLD AUTO: 10.9 FL (ref 7.4–10.4)
POTASSIUM SERPL-SCNC: 3.9 MMOL/L (ref 3.5–5.1)
PROT SERPL-MCNC: 7.5 GM/DL (ref 6.4–8.3)
RBC # BLD AUTO: 5.08 X10(6)/MCL (ref 4.7–6.1)
SODIUM SERPL-SCNC: 139 MMOL/L (ref 136–145)
TRIGL SERPL-MCNC: 130 MG/DL (ref 34–140)
VLDLC SERPL CALC-MCNC: 26 MG/DL
WBC # BLD AUTO: 9.94 X10(3)/MCL (ref 4.5–11.5)

## 2024-09-10 PROCEDURE — 80053 COMPREHEN METABOLIC PANEL: CPT

## 2024-09-10 PROCEDURE — 1160F RVW MEDS BY RX/DR IN RCRD: CPT | Mod: CPTII,,, | Performed by: NURSE PRACTITIONER

## 2024-09-10 PROCEDURE — 3078F DIAST BP <80 MM HG: CPT | Mod: CPTII,,, | Performed by: NURSE PRACTITIONER

## 2024-09-10 PROCEDURE — 99213 OFFICE O/P EST LOW 20 MIN: CPT | Mod: PBBFAC | Performed by: NURSE PRACTITIONER

## 2024-09-10 PROCEDURE — 3074F SYST BP LT 130 MM HG: CPT | Mod: CPTII,,, | Performed by: NURSE PRACTITIONER

## 2024-09-10 PROCEDURE — 1159F MED LIST DOCD IN RCRD: CPT | Mod: CPTII,,, | Performed by: NURSE PRACTITIONER

## 2024-09-10 PROCEDURE — 36415 COLL VENOUS BLD VENIPUNCTURE: CPT

## 2024-09-10 PROCEDURE — 83036 HEMOGLOBIN GLYCOSYLATED A1C: CPT

## 2024-09-10 PROCEDURE — 3008F BODY MASS INDEX DOCD: CPT | Mod: CPTII,,, | Performed by: NURSE PRACTITIONER

## 2024-09-10 PROCEDURE — 80061 LIPID PANEL: CPT

## 2024-09-10 PROCEDURE — 82306 VITAMIN D 25 HYDROXY: CPT

## 2024-09-10 PROCEDURE — 3044F HG A1C LEVEL LT 7.0%: CPT | Mod: CPTII,,, | Performed by: NURSE PRACTITIONER

## 2024-09-10 PROCEDURE — 85025 COMPLETE CBC W/AUTO DIFF WBC: CPT

## 2024-09-10 PROCEDURE — 99214 OFFICE O/P EST MOD 30 MIN: CPT | Mod: S$PBB,,, | Performed by: NURSE PRACTITIONER

## 2024-09-10 RX ORDER — ACETAMINOPHEN 500 MG
2000 TABLET ORAL DAILY
Qty: 90 CAPSULE | Refills: 1 | Status: SHIPPED | OUTPATIENT
Start: 2024-09-10

## 2024-09-10 RX ORDER — DICLOFENAC SODIUM 75 MG/1
75 TABLET, DELAYED RELEASE ORAL 2 TIMES DAILY
Qty: 60 TABLET | Refills: 3 | Status: SHIPPED | OUTPATIENT
Start: 2024-09-10

## 2024-09-10 NOTE — PROGRESS NOTES
"[facility] Medicine Clinic  GLENDY Jordan     Patient Name: Rajan Ayala   : 1990  MRN:[id number]     Chief Complaint     Chief Complaint   Patient presents with    Chronic back pain     Lab review        History of Present Illness     34 year old white male, presents in clinic for lab f/u. C/o chronic low back pain worse to right side. Voices right leg weakness. Denies sensation of numbness/tingling. Xray lumbar spine shows Minimal degenerative changes. Completed PT from 24 through 24 at [facility] ([facility]).  Completed 8 sessions, 4 wks, 2 x week. Progressed from generalized to localized pain.    PMH chronic back pain, tobacco use, BMI 26. 1ppd, started smoking age 18. History of torsion, hernia repair 2019. Work related injury; working as a  in [employer]. SI repair 23.  Denies chest pain, shortness of breath, cough, fever, headache, dizziness, weakness, abdominal pain, nausea, vomiting, diarrhea, constipation, dysuria, depression, anxiety. 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               Review of Systems     Review of Systems   Constitutional: Negative.    HENT: Negative.     Eyes: Negative.    Respiratory: Negative.     Cardiovascular: Negative.    Gastrointestinal: Negative.    Endocrine: Negative.    Genitourinary: Negative.    Musculoskeletal: Negative.    Integumentary:  Negative.   Allergic/Immunologic: Negative.    Neurological: Negative.    Hematological: Negative.    Psychiatric/Behavioral: Negative.     All other systems reviewed and are negative.       Physical Examination     Visit Vitals  /72 (BP Location: Left arm, Patient Position: Sitting, BP Method: Medium (Automatic))   Pulse 62   Temp 97.9 °F (36.6 °C) (Oral)   Resp 16   Ht 6' 1" (1.854 m)   Wt 89.8 kg (198 lb)   BMI 26.12 kg/m²        BP Readings from Last 6 Encounters:   09/10/24 111/72   24 109/71   24 118/74   24 135/82   23 128/79   22 125/82   ]    Wt Readings " from Last 6 Encounters:   09/10/24 89.8 kg (198 lb)   06/06/24 86.7 kg (191 lb 2.2 oz)   05/08/24 86.2 kg (190 lb 0.6 oz)   02/08/24 83 kg (182 lb 15.7 oz)   01/17/23 79 kg (174 lb 2.6 oz)   11/17/22 84.5 kg (186 lb 3.2 oz)   ]    BMI Readings from Last 3 Encounters:   09/10/24 26.12 kg/m²   06/06/24 25.22 kg/m²   05/08/24 25.07 kg/m²         Physical Exam  Vitals and nursing note reviewed.   Constitutional:       Appearance: Normal appearance.   HENT:      Head: Normocephalic.      Right Ear: Tympanic membrane, ear canal and external ear normal.      Left Ear: Tympanic membrane, ear canal and external ear normal.      Nose: Nose normal.      Mouth/Throat:      Mouth: Mucous membranes are moist.      Pharynx: Oropharynx is clear.   Eyes:      Extraocular Movements: Extraocular movements intact.      Conjunctiva/sclera: Conjunctivae normal.      Pupils: Pupils are equal, round, and reactive to light.   Cardiovascular:      Rate and Rhythm: Normal rate and regular rhythm.      Pulses: Normal pulses.      Heart sounds: Normal heart sounds.   Pulmonary:      Effort: Pulmonary effort is normal.      Breath sounds: Normal breath sounds.   Abdominal:      General: Bowel sounds are normal.      Palpations: Abdomen is soft.   Musculoskeletal:         General: Normal range of motion.      Cervical back: Normal range of motion and neck supple.   Skin:     General: Skin is warm and dry.      Capillary Refill: Capillary refill takes less than 2 seconds.   Neurological:      General: No focal deficit present.      Mental Status: He is alert and oriented to person, place, and time. Mental status is at baseline.   Psychiatric:         Mood and Affect: Mood normal.         Behavior: Behavior normal.         Thought Content: Thought content normal.         Judgment: Judgment normal.          Labs / Imaging     Chemistry:  Lab Results   Component Value Date     09/10/2024     04/14/2021    K 3.9 09/10/2024    K 4.1  04/14/2021    BUN 9.8 09/10/2024    BUN 7 (L) 04/14/2021    CREATININE 0.98 09/10/2024    CREATININE 0.85 04/14/2021    EGFRNORACEVR >60 09/10/2024    GLUCOSE 104 (H) 09/10/2024    CALCIUM 9.3 09/10/2024    CALCIUM 8.9 04/14/2021    ALKPHOS 106 09/10/2024    ALKPHOS 63 04/14/2021    LABPROT 7.5 09/10/2024    ALBUMIN 3.9 09/10/2024    ALBUMIN 3.7 04/14/2021    BILIDIR 0.10 05/03/2019    IBILI 0.80 05/03/2019    AST 22 09/10/2024    AST 50 04/14/2021    ALT 14 09/10/2024    ALT 23 04/14/2021    [id number] 58 09/10/2024        Lab Results   Component Value Date    HGBA1C 5.3 09/10/2024        Hematology:  Lab Results   Component Value Date    WBC 9.94 09/10/2024    WBC 8.70 04/14/2021    RBC 5.08 09/10/2024    RBC 4.42 (L) 04/14/2021    HGB 14.8 09/10/2024    HGB 13.3 (L) 04/14/2021    HCT 43.7 09/10/2024    HCT 39.1 (L) 04/14/2021    MCV 86.0 09/10/2024    MCV 88 04/14/2021    MCH 29.1 09/10/2024    MCH 30.0 04/14/2021    MCHC 33.9 09/10/2024    MCHC 33.9 04/14/2021    RDW 13.2 09/10/2024    RDW 13.4 04/14/2021     09/10/2024     04/14/2021    MPV 10.9 (H) 09/10/2024    MPV 9.9 04/14/2021    GRAN 5.6 04/14/2021    GRAN 64.0 04/14/2021    LYMPH 1.8 04/14/2021    LYMPH 20.7 04/14/2021    MONO 1.0 04/14/2021    MONO 11.3 04/14/2021    EOS 0.3 04/14/2021    BASO 0.10 04/14/2021    EOSINOPHIL 3.1 04/14/2021    BASOPHIL 0.9 04/14/2021        Lipid Panel:  Lab Results   Component Value Date    CHOL 196 09/10/2024    HDL 29 (L) 09/10/2024    .00 (H) 09/10/2024    TRIG 130 09/10/2024    TOTALCHOLEST 7 (H) 09/10/2024        Urine:  Lab Results   Component Value Date    APPEARANCEUA Clear 09/10/2024    SGUA 1.015 09/10/2024    PROTEINUA Negative 09/10/2024    KETONESUA Negative 09/10/2024    LEUKOCYTESUR Negative 09/10/2024    RBCUA 6-10 (A) 09/10/2024    WBCUA 0-5 09/10/2024    BACTERIA None Seen 09/10/2024    SQEPUA None Seen 09/10/2024    HYALINECASTS None Seen 09/10/2024          Assessment        ICD-10-CM ICD-9-CM   1. Dorsalgia, unspecified  M54.9 724.5   2. Hyperlipidemia, unspecified hyperlipidemia type  E78.5 272.4   3. Cigarette nicotine dependence without complication  F17.210 305.1   4. Vitamin D deficiency  E55.9 268.9   5. BMI 26.0-26.9,adult  Z68.26 V85.22        Plan     1. Dorsalgia, unspecified  Continue tizanidine prn and diclofenac prn  Lower back stretches daily.  Avoid strenuous lifting, use proper body mechanics.  Heating pad, Ice pack, Biofreeze or Epsom salt baths as needed.  Exercise to strengthen core muscles to support your back.  Consider physical therapy (PT)   - MRI Lumbar Spine Without Contrast; Future    2. Hyperlipidemia, unspecified hyperlipidemia type  Lab Results   Component Value Date    .00 (H) 09/10/2024    CHOL 196 09/10/2024    HDL 29 (L) 09/10/2024    TRIG 130 09/10/2024       Improved but not at goal. Take Omega 3 daily.   Stressed importance of dietary modifications. Follow a low cholesterol, low saturated fat diet with less that 200mg of cholesterol a day.  Avoid fried foods and high saturated fats (high saturated fats less than 7% of calories).  Add Flax Seed/Fish Oil supplements to diet. Increase dietary fiber.  Regular exercise can reduce LDL and raise HDL. Stressed importance of physical activity 5 times per week for 30 minutes per day.    - CBC Auto Differential; Future  - Comprehensive Metabolic Panel; Future  - Lipid Panel; Future    3. Cigarette nicotine dependence without complication  Smoking cessation advised. Instructed on smoking cessation program through [facility] and pharmacological interventions to aid in cessation.  >5 minutes allotted to educate patient on the harms of smoking, the urgency to quit, and the development of a plan for smoking cessation.     4. Vitamin D deficiency  Vitamin D level reviewed and is currently at goal, between 30-80 ng/mL. Continue OTC Vitamin D3 2000 IU daily.    - Vitamin D; Future    5. BMI 26.0-26.9,adult  Goal BMI  <30.  Exercise 5 times a week for 30 minutes per day.  Avoid soda, simple sugars, excessive rice, potatoes or bread. Limit fast foods and fried foods.  Choose complex carbs in moderation (example: green vegetables, beans, oatmeal). Eat plenty of fresh fruits and vegetables with lean meats daily.  Do not skip meals. Eat a balanced portion size.  Avoid fad diets. Consider permanent healthy life style changes.           Current Outpatient Medications   Medication Instructions    cholecalciferol (vitamin D3) (VITAMIN D3) 2,000 Units, Oral, Daily    diclofenac (VOLTAREN) 75 mg, Oral, 2 times daily    tiZANidine (ZANAFLEX) 2 MG tablet TAKE 1 TABLET(2 MG) BY MOUTH EVERY 12 HOURS AS NEEDED FOR MUSCLE SPASM       Orders Placed This Encounter   Procedures    MRI Lumbar Spine Without Contrast    CBC Auto Differential    Comprehensive Metabolic Panel    Lipid Panel    Vitamin D         Future Appointments   Date Time Provider Department Center   1/13/2025 10:00 AM Rianna Dow FNP [facility] [place]        Follow up in about 4 months (around 1/10/2025) for fasting labs.    Labs thoroughly reviewed with patient. Medication refills addressed today.  RTC prn and 4 months, with labs 1 week prior to the apt.  COVID 19 precautions given to patient.  Patient voices understanding of all discharge instructions.      GLENDY Jordan

## 2024-09-10 NOTE — TELEPHONE ENCOUNTER
Please [place] [facility] [place] [facility] [place]. States he did therapy from 7/2-8/27/24.

## 2024-09-24 ENCOUNTER — HOSPITAL ENCOUNTER (OUTPATIENT)
Dept: RADIOLOGY | Facility: HOSPITAL | Age: 34
Discharge: HOME | End: 2024-09-24
Attending: NURSE PRACTITIONER
Payer: MEDICAID

## 2024-09-24 DIAGNOSIS — M54.9 DORSALGIA, UNSPECIFIED: ICD-10-CM

## 2024-09-24 PROCEDURE — 72148 MRI LUMBAR SPINE W/O DYE: CPT | Mod: TC

## 2025-01-10 ENCOUNTER — LAB VISIT (OUTPATIENT)
Dept: LAB | Facility: HOSPITAL | Age: 35
End: 2025-01-10
Attending: NURSE PRACTITIONER
Payer: MEDICAID

## 2025-01-10 DIAGNOSIS — E78.5 HYPERLIPIDEMIA, UNSPECIFIED HYPERLIPIDEMIA TYPE: ICD-10-CM

## 2025-01-10 DIAGNOSIS — E55.9 VITAMIN D DEFICIENCY: ICD-10-CM

## 2025-01-10 LAB
25(OH)D3+25(OH)D2 SERPL-MCNC: 36 NG/ML (ref 30–80)
ALBUMIN SERPL-MCNC: 4.1 G/DL (ref 3.5–5)
ALBUMIN/GLOB SERPL: 1.2 RATIO (ref 1.1–2)
ALP SERPL-CCNC: 91 UNIT/L (ref 40–150)
ALT SERPL-CCNC: 12 UNIT/L (ref 0–55)
ANION GAP SERPL CALC-SCNC: 4 MEQ/L
AST SERPL-CCNC: 17 UNIT/L (ref 5–34)
BASOPHILS # BLD AUTO: 0.05 X10(3)/MCL
BASOPHILS NFR BLD AUTO: 0.7 %
BILIRUB SERPL-MCNC: 0.4 MG/DL
BUN SERPL-MCNC: 6.3 MG/DL (ref 8.9–20.6)
CALCIUM SERPL-MCNC: 9.5 MG/DL (ref 8.4–10.2)
CHLORIDE SERPL-SCNC: 107 MMOL/L (ref 98–107)
CHOLEST SERPL-MCNC: 197 MG/DL
CHOLEST/HDLC SERPL: 6 {RATIO} (ref 0–5)
CO2 SERPL-SCNC: 26 MMOL/L (ref 22–29)
CREAT SERPL-MCNC: 0.91 MG/DL (ref 0.72–1.25)
CREAT/UREA NIT SERPL: 7
EOSINOPHIL # BLD AUTO: 0.24 X10(3)/MCL (ref 0–0.9)
EOSINOPHIL NFR BLD AUTO: 3.2 %
ERYTHROCYTE [DISTWIDTH] IN BLOOD BY AUTOMATED COUNT: 13.2 % (ref 11.5–17)
GFR SERPLBLD CREATININE-BSD FMLA CKD-EPI: >60 ML/MIN/1.73/M2
GLOBULIN SER-MCNC: 3.5 GM/DL (ref 2.4–3.5)
GLUCOSE SERPL-MCNC: 94 MG/DL (ref 74–100)
HCT VFR BLD AUTO: 43.1 % (ref 42–52)
HDLC SERPL-MCNC: 35 MG/DL (ref 35–60)
HGB BLD-MCNC: 14.3 G/DL (ref 14–18)
IMM GRANULOCYTES # BLD AUTO: 0.02 X10(3)/MCL (ref 0–0.04)
IMM GRANULOCYTES NFR BLD AUTO: 0.3 %
LDLC SERPL CALC-MCNC: 142 MG/DL (ref 50–140)
LYMPHOCYTES # BLD AUTO: 2.37 X10(3)/MCL (ref 0.6–4.6)
LYMPHOCYTES NFR BLD AUTO: 31.4 %
MCH RBC QN AUTO: 29.1 PG (ref 27–31)
MCHC RBC AUTO-ENTMCNC: 33.2 G/DL (ref 33–36)
MCV RBC AUTO: 87.8 FL (ref 80–94)
MONOCYTES # BLD AUTO: 0.66 X10(3)/MCL (ref 0.1–1.3)
MONOCYTES NFR BLD AUTO: 8.8 %
NEUTROPHILS # BLD AUTO: 4.2 X10(3)/MCL (ref 2.1–9.2)
NEUTROPHILS NFR BLD AUTO: 55.6 %
NRBC BLD AUTO-RTO: 0 %
PLATELET # BLD AUTO: 254 X10(3)/MCL (ref 130–400)
PMV BLD AUTO: 11.2 FL (ref 7.4–10.4)
POTASSIUM SERPL-SCNC: 4.1 MMOL/L (ref 3.5–5.1)
PROT SERPL-MCNC: 7.6 GM/DL (ref 6.4–8.3)
RBC # BLD AUTO: 4.91 X10(6)/MCL (ref 4.7–6.1)
SODIUM SERPL-SCNC: 137 MMOL/L (ref 136–145)
TRIGL SERPL-MCNC: 102 MG/DL (ref 34–140)
VLDLC SERPL CALC-MCNC: 20 MG/DL
WBC # BLD AUTO: 7.54 X10(3)/MCL (ref 4.5–11.5)

## 2025-01-10 PROCEDURE — 36415 COLL VENOUS BLD VENIPUNCTURE: CPT

## 2025-01-10 PROCEDURE — 82306 VITAMIN D 25 HYDROXY: CPT

## 2025-01-10 PROCEDURE — 85025 COMPLETE CBC W/AUTO DIFF WBC: CPT

## 2025-01-10 PROCEDURE — 80053 COMPREHEN METABOLIC PANEL: CPT

## 2025-01-10 PROCEDURE — 80061 LIPID PANEL: CPT

## 2025-01-24 ENCOUNTER — TELEPHONE (OUTPATIENT)
Dept: INTERNAL MEDICINE | Facility: CLINIC | Age: 35
End: 2025-01-24
Payer: MEDICAID

## 2025-02-06 ENCOUNTER — OFFICE VISIT (OUTPATIENT)
Dept: INTERNAL MEDICINE | Facility: CLINIC | Age: 35
End: 2025-02-06
Payer: MEDICAID

## 2025-02-06 VITALS
BODY MASS INDEX: 25.84 KG/M2 | WEIGHT: 195 LBS | SYSTOLIC BLOOD PRESSURE: 112 MMHG | HEIGHT: 73 IN | TEMPERATURE: 98 F | DIASTOLIC BLOOD PRESSURE: 73 MMHG | RESPIRATION RATE: 20 BRPM | HEART RATE: 64 BPM | OXYGEN SATURATION: 99 %

## 2025-02-06 DIAGNOSIS — F17.210 CIGARETTE NICOTINE DEPENDENCE WITHOUT COMPLICATION: ICD-10-CM

## 2025-02-06 DIAGNOSIS — M51.362 DEGENERATION OF INTERVERTEBRAL DISC OF LUMBAR REGION WITH DISCOGENIC BACK PAIN AND LOWER EXTREMITY PAIN: ICD-10-CM

## 2025-02-06 DIAGNOSIS — E78.5 HYPERLIPIDEMIA, UNSPECIFIED HYPERLIPIDEMIA TYPE: ICD-10-CM

## 2025-02-06 DIAGNOSIS — E55.9 VITAMIN D DEFICIENCY: ICD-10-CM

## 2025-02-06 PROCEDURE — 1159F MED LIST DOCD IN RCRD: CPT | Mod: CPTII,,, | Performed by: NURSE PRACTITIONER

## 2025-02-06 PROCEDURE — 3074F SYST BP LT 130 MM HG: CPT | Mod: CPTII,,, | Performed by: NURSE PRACTITIONER

## 2025-02-06 PROCEDURE — 3008F BODY MASS INDEX DOCD: CPT | Mod: CPTII,,, | Performed by: NURSE PRACTITIONER

## 2025-02-06 PROCEDURE — 3078F DIAST BP <80 MM HG: CPT | Mod: CPTII,,, | Performed by: NURSE PRACTITIONER

## 2025-02-06 PROCEDURE — 99213 OFFICE O/P EST LOW 20 MIN: CPT | Mod: PBBFAC | Performed by: NURSE PRACTITIONER

## 2025-02-06 PROCEDURE — 1160F RVW MEDS BY RX/DR IN RCRD: CPT | Mod: CPTII,,, | Performed by: NURSE PRACTITIONER

## 2025-02-06 PROCEDURE — 99406 BEHAV CHNG SMOKING 3-10 MIN: CPT | Mod: S$PBB,,, | Performed by: NURSE PRACTITIONER

## 2025-02-06 PROCEDURE — 99214 OFFICE O/P EST MOD 30 MIN: CPT | Mod: S$PBB,25,, | Performed by: NURSE PRACTITIONER

## 2025-02-06 RX ORDER — CELECOXIB 100 MG/1
100 CAPSULE ORAL DAILY
Qty: 90 CAPSULE | Refills: 1 | Status: SHIPPED | OUTPATIENT
Start: 2025-02-06

## 2025-02-06 RX ORDER — TIZANIDINE 2 MG/1
2 TABLET ORAL EVERY 12 HOURS PRN
Qty: 45 TABLET | Refills: 2 | Status: SHIPPED | OUTPATIENT
Start: 2025-02-06

## 2025-02-06 RX ORDER — ACETAMINOPHEN 500 MG
2000 TABLET ORAL DAILY
Qty: 90 CAPSULE | Refills: 1 | Status: SHIPPED | OUTPATIENT
Start: 2025-02-06

## 2025-02-06 NOTE — PROGRESS NOTES
"[facility] Medicine [facility]  GLENDY Jordan     Patient Name: Rajan Ayala   : 1990  MRN:[id number]     Chief Complaint     Chief Complaint   Patient presents with    Follow-up    Labs Only     MRI     MRI Results      4 month f/u for Labs /MRI results         History of Present Illness     34 year old white male, presents in clinic for lab f/u. C/o chronic low back pain worse to right side. Voices intermittent right leg weakness. Denies sensation of numbness/tingling. Xray lumbar spine shows Minimal degenerative changes. Completed PT from 24 through 24 at [facility] (isidro Patel).  Completed 8 sessions, 4 wks, 2 x week. MRI 24; L spine shows DDD. States diclofenac is not controlling pain.    PMH chronic back pain, tobacco use, BMI 25. 1ppd, started smoking age 18. History of torsion, hernia repair 2019. Work related injury; working as a  in [employer]. SI repair 23.  Denies chest pain, shortness of breath, cough, fever, headache, dizziness, weakness, abdominal pain, nausea, vomiting, diarrhea, constipation, dysuria, depression, anxiety. 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                Review of Systems     Review of Systems   Constitutional: Negative.    HENT: Negative.     Eyes: Negative.    Respiratory: Negative.     Cardiovascular: Negative.    Gastrointestinal: Negative.    Endocrine: Negative.    Genitourinary: Negative.    Musculoskeletal:  Positive for back pain and leg pain.   Integumentary:  Negative.   Allergic/Immunologic: Negative.    Neurological:  Positive for weakness.   Hematological: Negative.    Psychiatric/Behavioral: Negative.     All other systems reviewed and are negative.       Physical Examination     Visit Vitals  /73   Pulse 64   Temp 97.8 °F (36.6 °C) (Oral)   Resp 20   Ht 6' 1" (1.854 m)   Wt 88.5 kg (195 lb)   SpO2 99%   BMI 25.73 kg/m²        BP Readings from Last 6 Encounters:   25 112/73   09/10/24 111/72   24 109/71 " ----- Message from Lula Villaseñor MD sent at 7/17/2018  8:35 AM CDT -----  Please call with negative cultures. There were some signs of inflammation. I would like her to see gastroenterology. She may need some further testing to be done. Please refer to GI     05/08/24 118/74   02/08/24 135/82   01/17/23 128/79   ]    Wt Readings from Last 6 Encounters:   02/06/25 88.5 kg (195 lb)   09/10/24 89.8 kg (198 lb)   06/06/24 86.7 kg (191 lb 2.2 oz)   05/08/24 86.2 kg (190 lb 0.6 oz)   02/08/24 83 kg (182 lb 15.7 oz)   01/17/23 79 kg (174 lb 2.6 oz)   ]    BMI Readings from Last 3 Encounters:   09/10/24 26.12 kg/m²   06/06/24 25.22 kg/m²   05/08/24 25.07 kg/m²         Physical Exam  Vitals and nursing note reviewed.   Constitutional:       Appearance: Normal appearance.   HENT:      Head: Normocephalic.      Right Ear: Tympanic membrane, ear canal and external ear normal.      Left Ear: Tympanic membrane, ear canal and external ear normal.      Nose: Nose normal.      Mouth/Throat:      Mouth: Mucous membranes are moist.      Pharynx: Oropharynx is clear.   Eyes:      Extraocular Movements: Extraocular movements intact.      Conjunctiva/sclera: Conjunctivae normal.      Pupils: Pupils are equal, round, and reactive to light.   Cardiovascular:      Rate and Rhythm: Normal rate and regular rhythm.      Pulses: Normal pulses.      Heart sounds: Normal heart sounds.   Pulmonary:      Effort: Pulmonary effort is normal.      Breath sounds: Normal breath sounds.   Abdominal:      General: Bowel sounds are normal.      Palpations: Abdomen is soft.   Musculoskeletal:         General: Normal range of motion.      Cervical back: Normal range of motion and neck supple.   Skin:     General: Skin is warm and dry.      Capillary Refill: Capillary refill takes less than 2 seconds.   Neurological:      General: No focal deficit present.      Mental Status: He is alert and oriented to person, place, and time. Mental status is at baseline.   Psychiatric:         Mood and Affect: Mood normal.         Behavior: Behavior normal.         Thought Content: Thought content normal.         Judgment: Judgment normal.          Labs / Imaging     Chemistry:  Lab Results   Component Value Date      01/10/2025     04/14/2021    K 4.1 01/10/2025    K 4.1 04/14/2021    BUN 6.3 (L) 01/10/2025    BUN 7 (L) 04/14/2021    CREATININE 0.91 01/10/2025    CREATININE 0.85 04/14/2021    EGFRNORACEVR >60 01/10/2025    GLUCOSE 94 01/10/2025    CALCIUM 9.5 01/10/2025    CALCIUM 8.9 04/14/2021    ALKPHOS 91 01/10/2025    ALKPHOS 63 04/14/2021    LABPROT 7.6 01/10/2025    ALBUMIN 4.1 01/10/2025    ALBUMIN 3.7 04/14/2021    BILIDIR 0.10 05/03/2019    IBILI 0.80 05/03/2019    AST 17 01/10/2025    AST 50 04/14/2021    ALT 12 01/10/2025    ALT 23 04/14/2021    [id number] 36 01/10/2025        Lab Results   Component Value Date    HGBA1C 5.3 09/10/2024        Hematology:  Lab Results   Component Value Date    WBC 7.54 01/10/2025    WBC 8.70 04/14/2021    RBC 4.91 01/10/2025    RBC 4.42 (L) 04/14/2021    HGB 14.3 01/10/2025    HGB 13.3 (L) 04/14/2021    HCT 43.1 01/10/2025    HCT 39.1 (L) 04/14/2021    MCV 87.8 01/10/2025    MCV 88 04/14/2021    MCH 29.1 01/10/2025    MCH 30.0 04/14/2021    MCHC 33.2 01/10/2025    MCHC 33.9 04/14/2021    RDW 13.2 01/10/2025    RDW 13.4 04/14/2021     01/10/2025     04/14/2021    MPV 11.2 (H) 01/10/2025    MPV 9.9 04/14/2021    GRAN 5.6 04/14/2021    GRAN 64.0 04/14/2021    LYMPH 1.8 04/14/2021    LYMPH 20.7 04/14/2021    MONO 1.0 04/14/2021    MONO 11.3 04/14/2021    EOS 0.3 04/14/2021    BASO 0.10 04/14/2021    EOSINOPHIL 3.1 04/14/2021    BASOPHIL 0.9 04/14/2021        Lipid Panel:  Lab Results   Component Value Date    CHOL 197 01/10/2025    HDL 35 01/10/2025    .00 (H) 01/10/2025    TRIG 102 01/10/2025    TOTALCHOLEST 6 (H) 01/10/2025        Urine:  Lab Results   Component Value Date    APPEARANCEUA Clear 09/10/2024    SGUA 1.015 09/10/2024    PROTEINUA Negative 09/10/2024    KETONESUA Negative 09/10/2024    LEUKOCYTESUR Negative 09/10/2024    RBCUA 6-10 (A) 09/10/2024    WBCUA 0-5 09/10/2024    BACTERIA None Seen 09/10/2024    SQEPUA None Seen 09/10/2024     HYALINECASTS None Seen 09/10/2024      MRI LUMBAR SPINE WITHOUT CONTRAST     TECHNIQUE:  Low back pain, symptoms persist with > 6wks conservative treatment;Dorsalgia, unspecified     COMPARISON:  Lumbar spine radiographs May 15, 2022     FINDINGS:  For the purpose of this report, the most inferior well developed intervertebral disc space is presumed to represent L5-S1.  There are lumbar vertebrae Schmorl node defects involving the opposing endplates of T12-L1, L1-L2 and L2-L3.  Otherwise, lumbar vertebrae stature and alignment is well maintained. The visualized thoracic cord is unremarkable. The conus medullaris terminates at L1-L2.  Disc segmental analysis is given below:     L1-L2 level is unremarkable.     L2-L3 level is unremarkable.     At L3-L4, L3-L4 level is unremarkable.     At L4-L5, there is bulging of annulus fibrosis which slightly flattens the ventral thecal sac.  Bilateral minimal facet arthropathy.  Central canal is not stenosed.  There are no significant narrowings of the neural foramen.     At L5-S1, disc is unremarkable.  Central canal is not stenosed.  There are no narrowings of the neural foramen.     Impression:     Lumbar degenerative disc disease and spondylosis level by level discussed above.        Electronically signed by:Doug Flowers  Date:                                            09/24/2024    Assessment       ICD-10-CM ICD-9-CM   1. Degeneration of intervertebral disc of lumbar region with discogenic back pain and lower extremity pain  M51.362 722.52   2. Cigarette nicotine dependence without complication  F17.210 305.1   3. Vitamin D deficiency  E55.9 268.9   4. BMI 25.0-25.9,adult  Z68.25 V85.21   5. Hyperlipidemia, unspecified hyperlipidemia type  E78.5 272.4        Plan     1. Degeneration of intervertebral disc of lumbar region with discogenic back pain and lower extremity pain  Continue tizanidine prn /start RX Celebrex 100 qd  Dc diclofenac  Declines repeat PT or PM referral at  this time  Lower back stretches daily.  Avoid strenuous lifting, use proper body mechanics.  Heating pad, Ice pack, Biofreeze or Epsom salt baths as needed.  Exercise to strengthen core muscles to support your back.      2. Cigarette nicotine dependence without complication  Smoking cessation advised. Instructed on smoking cessation program through [facility] and pharmacological interventions to aid in cessation.  >5 minutes allotted to educate patient on the harms of smoking, the urgency to quit, and the development of a plan for smoking cessation.     3. Vitamin D deficiency  Vitamin D level reviewed and is currently at goal, between 30-80 ng/mL. Continue OTC Vitamin D3 2000 IU daily.      4. BMI 25.0-25.9,adult  Goal BMI <30.  Exercise 5 times a week for 30 minutes per day.  Avoid soda, simple sugars, excessive rice, potatoes or bread. Limit fast foods and fried foods.  Choose complex carbs in moderation (example: green vegetables, beans, oatmeal). Eat plenty of fresh fruits and vegetables with lean meats daily.  Do not skip meals. Eat a balanced portion size.  Avoid fad diets. Consider permanent healthy life style changes.           Current Outpatient Medications   Medication Instructions    celecoxib (CELEBREX) 100 mg, Oral, Daily    cholecalciferol (vitamin D3) (VITAMIN D3) 2,000 Units, Oral, Daily    tiZANidine (ZANAFLEX) 2 mg, Oral, Every 12 hours PRN       Orders Placed This Encounter   Procedures    Vitamin D    Lipid Panel    Comprehensive Metabolic Panel    CBC Auto Differential         Future Appointments   Date Time Provider Department Center   8/7/2025  9:40 AM Rianna Dow FNP [facility]          Follow up in about 6 months (around 8/6/2025) for fasting labs.    Labs thoroughly reviewed with patient. Medication refills addressed today.  RTC prn and 6 months, with labs 1 week prior to the apt.  COVID 19 precautions given to patient.  Patient voices understanding of all discharge  instructions.      RNOAN JordanP

## 2025-02-14 ENCOUNTER — TELEPHONE (OUTPATIENT)
Dept: INTERNAL MEDICINE | Facility: CLINIC | Age: 35
End: 2025-02-14
Payer: MEDICAID

## 2025-02-19 ENCOUNTER — TELEPHONE (OUTPATIENT)
Dept: INTERNAL MEDICINE | Facility: CLINIC | Age: 35
End: 2025-02-19
Payer: MEDICAID

## 2025-02-19 NOTE — TELEPHONE ENCOUNTER
I called Mr. Tolentino to let him know that the PA for celecoxib  was denied. And message states appeal not supported. I recommended that he call his insurance company and let them know that medication was denied and find out what they will cover and let us know so we can send new rx.

## 2025-02-25 ENCOUNTER — HOSPITAL ENCOUNTER (EMERGENCY)
Facility: HOSPITAL | Age: 35
Discharge: HOME | End: 2025-02-25
Attending: EMERGENCY MEDICINE
Payer: MEDICAID

## 2025-02-25 VITALS
HEIGHT: 73 IN | DIASTOLIC BLOOD PRESSURE: 81 MMHG | WEIGHT: 197.19 LBS | BODY MASS INDEX: 26.13 KG/M2 | OXYGEN SATURATION: 96 % | HEART RATE: 82 BPM | SYSTOLIC BLOOD PRESSURE: 127 MMHG | RESPIRATION RATE: 18 BRPM | TEMPERATURE: 98 F

## 2025-02-25 DIAGNOSIS — J18.9 PNEUMONIA OF BOTH LUNGS DUE TO INFECTIOUS ORGANISM, UNSPECIFIED PART OF LUNG: Primary | ICD-10-CM

## 2025-02-25 PROCEDURE — 99284 EMERGENCY DEPT VISIT MOD MDM: CPT | Mod: 25

## 2025-02-25 RX ORDER — METHYLPREDNISOLONE 4 MG/1
TABLET ORAL
Qty: 21 EACH | Refills: 0 | Status: SHIPPED | OUTPATIENT
Start: 2025-02-25

## 2025-02-25 RX ORDER — ALBUTEROL SULFATE 90 UG/1
1-2 INHALANT RESPIRATORY (INHALATION) EVERY 6 HOURS PRN
Qty: 6.7 G | Refills: 0 | Status: SHIPPED | OUTPATIENT
Start: 2025-02-25 | End: 2026-02-25

## 2025-02-25 RX ORDER — PROMETHAZINE HYDROCHLORIDE AND DEXTROMETHORPHAN HYDROBROMIDE 6.25; 15 MG/5ML; MG/5ML
5 SYRUP ORAL EVERY 6 HOURS PRN
Qty: 118 ML | Refills: 0 | Status: SHIPPED | OUTPATIENT
Start: 2025-02-25 | End: 2025-03-04

## 2025-02-25 RX ORDER — AZITHROMYCIN 250 MG/1
250 TABLET, FILM COATED ORAL DAILY
Qty: 6 TABLET | Refills: 0 | Status: SHIPPED | OUTPATIENT
Start: 2025-02-25

## 2025-02-25 NOTE — Clinical Note
"Rajan"De Ayala was seen and treated in our emergency department on 2/25/2025.  He may return to work on 02/28/2025.       If you have any questions or concerns, please don't hesitate to call.      Ramon Patel MD"

## 2025-02-26 NOTE — ED PROVIDER NOTES
"Encounter Date: 2/25/2025       History     Chief Complaint   Patient presents with    Generalized Body Aches     States has been fighting "flu-like symptoms" all week. Headache, cough, nausea, vomiting. Fevers at home. Took Tylenol at 1400. Denies sore throat.      A 34 y.o. male patient with a history of no known medical problems presents to the ED with productive cough with yellow mucous, nausea, body aches, and fever. The onset is 7 days ago. Patient states he felt like he was getting better, then 2 days ago got worse. Admits SOB worse when laying down at night. Patient does admit smoking 0.5-1 ppd. Patient states he feels "congested and can hear a rattle in his chest".       The history is provided by the patient.     Review of patient's allergies indicates:  No Known Allergies  History reviewed. No pertinent past medical history.  Past Surgical History:   Procedure Laterality Date    EPIDURAL STEROID INJECTION INTO LUMBAR SPINE      FUSION OF SACROILIAC JOINT Right 4/13/2021    Procedure: FUSION, SACROILIAC JOINT;  Surgeon: Thomas Quiles Jr., MD;  Location: [facility];  Service: Orthopedics;  Laterality: Right;  7:30 per Elroy    HERNIA REPAIR       Family History   Problem Relation Name Age of Onset    Diabetes Mother      Hypertension Mother      Hypertension Father       Social History[1]  Review of Systems   Constitutional:  Positive for chills, fatigue and fever.   HENT:  Positive for congestion, postnasal drip and rhinorrhea. Negative for sinus pressure, sore throat and trouble swallowing.    Eyes:  Negative for visual disturbance.   Respiratory:  Positive for cough, shortness of breath and wheezing. Negative for chest tightness and stridor.    Cardiovascular:  Negative for chest pain, palpitations and leg swelling.   Gastrointestinal:  Positive for diarrhea and nausea. Negative for abdominal pain, blood in stool and vomiting.   Genitourinary:  Negative for difficulty urinating and dysuria. "   Musculoskeletal:  Negative for arthralgias.   Skin:  Negative for color change and rash.   Neurological:  Negative for weakness and headaches.   Hematological:  Does not bruise/bleed easily.   Psychiatric/Behavioral:  Negative for confusion.    All other systems reviewed and are negative.      Physical Exam     Initial Vitals [02/25/25 2008]   BP Pulse Resp Temp SpO2   127/81 82 18 98.3 °F (36.8 °C) 96 %      MAP       --         Physical Exam    Nursing note and vitals reviewed.  Constitutional: He appears well-developed and well-nourished.   HENT:   Head: Normocephalic and atraumatic.   Right Ear: Tympanic membrane, external ear and ear canal normal.   Left Ear: Tympanic membrane, external ear and ear canal normal.   Nose: Rhinorrhea present. No sinus tenderness. Mouth/Throat: Uvula is midline, oropharynx is clear and moist and mucous membranes are normal. No oropharyngeal exudate or posterior oropharyngeal erythema.   Eyes: Conjunctivae and EOM are normal.   Neck: Neck supple.   Cardiovascular:  Normal rate, regular rhythm and normal heart sounds.     Exam reveals no gallop and no friction rub.       No murmur heard.  Pulmonary/Chest: No respiratory distress. He has wheezes. He has rhonchi. He has no rales. He exhibits no tenderness.   Abdominal: He exhibits no distension.   Musculoskeletal:      Cervical back: Neck supple.     Neurological: He is alert and oriented to person, place, and time.   Skin: Skin is warm and dry. Capillary refill takes less than 2 seconds.         ED Course   Procedures  Labs Reviewed - No data to display       Imaging Results              X-Ray Chest 1 View (Preliminary result)  Result time 02/25/25 20:27:55   Procedure changed from X-Ray Chest PA And Lateral     Wet Read by Mary Dennis PA (02/25/25 20:27:55, [facility], Emergency Medicine)    Increased interstitial markings bilaterally, clinical correlation may be infectious cause.                   "                    Medications - No data to display  Medical Decision Making  A 34 y.o. male patient with a history of no known medical problems presents to the ED with productive cough with yellow mucous, nausea, body aches, and fever. The onset is 7 days ago. Patient states he felt like he was getting better, then 2 days ago got worse. Admits SOB worse when laying down at night. Patient does admit smoking 0.5-1 ppd. Patient states he feels "congested and can hear a rattle in his chest".       CXR shows increased pulmonary congestion with rhonchi on auscultation. Will treat for clinical atypical pneumonia with antibiotics.     Clinical impression:  Pneumonia of both lungs due to infectious organism, unspecified part of lung (Primary)    Patient is non-toxic appearing and tolerating nutritional intake. Patient's vital signs and clinical condition are stable for DC with ED Prescriptions     Medication Sig Dispense Start Date End Date Auth. Provider    azithromycin (Z-JUANY) 250 MG tablet Take 1 tablet (250 mg total) by mouth   once daily. Take first 2 tablets together, then 1 every day until   finished. 6 tablet 2/25/2025 -- Mary Dennis PA    methylPREDNISolone (MEDROL DOSEPACK) 4 mg tablet use as directed 21 each   2/25/2025 -- Mary Dennis PA    albuterol (PROVENTIL/VENTOLIN HFA) 90 mcg/actuation inhaler Inhale 1-2   puffs into the lungs every 6 (six) hours as needed for Wheezing or   Shortness of Breath. Rescue 6.7 g 2/25/2025 2/25/2026 Mary Dennis PA    promethazine-dextromethorphan (PROMETHAZINE-DM) 6.25-15 mg/5 mL Syrp Take   5 mLs by mouth every 6 (six) hours as needed (cough). 118 mL 2/25/2025   3/4/2025 Mayr Dennis PA         Follow-up: PCP or Internal medicine clinic within 3 days  Referrals made: none    Strict follow-up precautions given. Patient verbalizes understanding of treatment plan and ED return precautions.         Amount and/or Complexity of Data Reviewed  Radiology: " ordered and independent interpretation performed. Decision-making details documented in ED Course.    Risk  Prescription drug management.  Risk Details: Given strict ED return precautions. I have spoken with the patient and/or caregivers. I have explained the patient's condition, diagnoses and treatment plan based on the information available to me at this time. I have answered the patient's and/or caregiver's questions and addressed any concerns. The patient and/or caregivers have as good an understanding of the patient's diagnosis, condition and treatment plan as can be expected at this point. The patient's condition is stable and appropriate for discharge from the emergency department.      The patient will pursue further outpatient evaluation with the primary care physician or other designated or consulting physician as outlined in the discharge instructions. The patient and/or caregivers are agreeable to this plan of care and follow-up instructions have been explained in detail. The patient and/or caregivers have received these instructions in written format and have expressed an understanding of the discharge instructions. The patient and/or caregivers are aware that any significant change in condition or worsening of symptoms should prompt an immediate return to this or the closest emergency department or a call to 911.               Additional MDM:   Differential Diagnosis:   Other: The following diagnoses were also considered and will be evaluated: Viral syndrome, Sinusitis and Viral URI.            ED Course as of 02/25/25 2034 Tue Feb 25, 2025 2027 X-Ray Chest 1 View  Increased interstitial markings bilaterally, clinical correlation may be infectious cause.    [AG]      ED Course User Index  [AG] Mary Dennis PA                           Clinical Impression:  Final diagnoses:  [J18.9] Pneumonia of both lungs due to infectious organism, unspecified part of lung (Primary)          ED Disposition  Condition    Discharge Stable          ED Prescriptions       Medication Sig Dispense Start Date End Date Auth. Provider    azithromycin (Z-JUANY) 250 MG tablet Take 1 tablet (250 mg total) by mouth once daily. Take first 2 tablets together, then 1 every day until finished. 6 tablet 2/25/2025 -- Mary Dennis PA    methylPREDNISolone (MEDROL DOSEPACK) 4 mg tablet use as directed 21 each 2/25/2025 -- Mary Dennis PA    albuterol (PROVENTIL/VENTOLIN HFA) 90 mcg/actuation inhaler Inhale 1-2 puffs into the lungs every 6 (six) hours as needed for Wheezing or Shortness of Breath. Rescue 6.7 g 2/25/2025 2/25/2026 Mary Dennis PA    promethazine-dextromethorphan (PROMETHAZINE-DM) 6.25-15 mg/5 mL Syrp Take 5 mLs by mouth every 6 (six) hours as needed (cough). 118 mL 2/25/2025 3/4/2025 Mary Dennis PA          Follow-up Information       Follow up With Specialties Details Why Contact Info    [facility] Emergency Medicine Go to  If symptoms worsen, As needed [address] [facility] [address] [phone]    Rianna Dow, P Family [facility]   [address] [facility] [address]  [phone]                 [1]   Social History  Tobacco Use    Smoking status: Every Day     Current packs/day: 1.00     Average packs/day: 1 pack/day for 14.1 years (14.1 ttl pk-yrs)     Types: Cigarettes     Start date: 1/15/2011    Smokeless tobacco: Never   Substance Use Topics    Alcohol use: Not Currently     Comment: rare    Drug use: Never        Mary Dennis PA  02/25/25 2034

## 2025-07-11 ENCOUNTER — HOSPITAL ENCOUNTER (EMERGENCY)
Facility: HOSPITAL | Age: 35
Discharge: HOME | End: 2025-07-11
Attending: EMERGENCY MEDICINE
Payer: MEDICAID

## 2025-07-11 VITALS
WEIGHT: 202.88 LBS | DIASTOLIC BLOOD PRESSURE: 81 MMHG | RESPIRATION RATE: 18 BRPM | TEMPERATURE: 97 F | HEART RATE: 60 BPM | SYSTOLIC BLOOD PRESSURE: 122 MMHG | OXYGEN SATURATION: 99 % | HEIGHT: 73 IN | BODY MASS INDEX: 26.89 KG/M2

## 2025-07-11 DIAGNOSIS — R07.9 CHEST PAIN: ICD-10-CM

## 2025-07-11 DIAGNOSIS — E83.39 HYPOPHOSPHATEMIA: Primary | ICD-10-CM

## 2025-07-11 LAB
ACCEPTIBLE SP GR UR QL: 1.01 (ref 1–1.03)
ALBUMIN SERPL-MCNC: 4 G/DL (ref 3.5–5)
ALBUMIN/GLOB SERPL: 1.2 RATIO (ref 1.1–2)
ALP SERPL-CCNC: 106 UNIT/L (ref 40–150)
ALT SERPL-CCNC: 12 UNIT/L (ref 0–55)
AMPHET UR QL SCN: NEGATIVE
ANION GAP SERPL CALC-SCNC: 9 MEQ/L
AST SERPL-CCNC: 16 UNIT/L (ref 11–45)
BARBITURATE SCN PRESENT UR: NEGATIVE
BASOPHILS # BLD AUTO: 0.05 X10(3)/MCL
BASOPHILS NFR BLD AUTO: 0.6 %
BENZODIAZ UR QL SCN: NEGATIVE
BILIRUB SERPL-MCNC: 0.4 MG/DL
BUN SERPL-MCNC: 9.2 MG/DL (ref 8.9–20.6)
CALCIUM SERPL-MCNC: 9.3 MG/DL (ref 8.4–10.2)
CANNABINOIDS UR QL SCN: NEGATIVE
CHLORIDE SERPL-SCNC: 109 MMOL/L (ref 98–107)
CO2 SERPL-SCNC: 22 MMOL/L (ref 22–29)
COCAINE UR QL SCN: NEGATIVE
CREAT SERPL-MCNC: 0.9 MG/DL (ref 0.72–1.25)
CREAT/UREA NIT SERPL: 10
EOSINOPHIL # BLD AUTO: 0.12 X10(3)/MCL (ref 0–0.9)
EOSINOPHIL NFR BLD AUTO: 1.5 %
ERYTHROCYTE [DISTWIDTH] IN BLOOD BY AUTOMATED COUNT: 13.1 % (ref 11.5–17)
FENTANYL UR QL SCN: NEGATIVE
GFR SERPLBLD CREATININE-BSD FMLA CKD-EPI: >60 ML/MIN/1.73/M2
GLOBULIN SER-MCNC: 3.4 GM/DL (ref 2.4–3.5)
GLUCOSE SERPL-MCNC: 94 MG/DL (ref 74–100)
HCT VFR BLD AUTO: 41.4 % (ref 42–52)
HGB BLD-MCNC: 14.1 G/DL (ref 14–18)
HOLD SPECIMEN: NORMAL
HOLD SPECIMEN: NORMAL
IMM GRANULOCYTES # BLD AUTO: 0.02 X10(3)/MCL (ref 0–0.04)
IMM GRANULOCYTES NFR BLD AUTO: 0.3 %
LYMPHOCYTES # BLD AUTO: 2.58 X10(3)/MCL (ref 0.6–4.6)
LYMPHOCYTES NFR BLD AUTO: 33.1 %
MAGNESIUM SERPL-MCNC: 1.9 MG/DL (ref 1.6–2.6)
MCH RBC QN AUTO: 29 PG (ref 27–31)
MCHC RBC AUTO-ENTMCNC: 34.1 G/DL (ref 33–36)
MCV RBC AUTO: 85.2 FL (ref 80–94)
MDMA UR QL SCN: NEGATIVE
MONOCYTES # BLD AUTO: 0.62 X10(3)/MCL (ref 0.1–1.3)
MONOCYTES NFR BLD AUTO: 8 %
NEUTROPHILS # BLD AUTO: 4.4 X10(3)/MCL (ref 2.1–9.2)
NEUTROPHILS NFR BLD AUTO: 56.5 %
NRBC BLD AUTO-RTO: 0 %
OHS QRS DURATION: 90 MS
OHS QTC CALCULATION: 424 MS
OPIATES UR QL SCN: NEGATIVE
PCP UR QL: NEGATIVE
PH UR: 7 [PH] (ref 3–11)
PHOSPHATE SERPL-MCNC: 1.3 MG/DL (ref 2.3–4.7)
PHOSPHATE SERPL-MCNC: <0.7 MG/DL (ref 2.3–4.7)
PLATELET # BLD AUTO: 240 X10(3)/MCL (ref 130–400)
PMV BLD AUTO: 11.4 FL (ref 7.4–10.4)
POCT GLUCOSE: 87 MG/DL (ref 70–110)
POTASSIUM SERPL-SCNC: 3.6 MMOL/L (ref 3.5–5.1)
PROT SERPL-MCNC: 7.4 GM/DL (ref 6.4–8.3)
RBC # BLD AUTO: 4.86 X10(6)/MCL (ref 4.7–6.1)
SODIUM SERPL-SCNC: 140 MMOL/L (ref 136–145)
TROPONIN I SERPL-MCNC: <0.01 NG/ML (ref 0–0.04)
WBC # BLD AUTO: 7.79 X10(3)/MCL (ref 4.5–11.5)

## 2025-07-11 PROCEDURE — 93005 ELECTROCARDIOGRAM TRACING: CPT

## 2025-07-11 PROCEDURE — 99285 EMERGENCY DEPT VISIT HI MDM: CPT | Mod: 25

## 2025-07-11 PROCEDURE — 84484 ASSAY OF TROPONIN QUANT: CPT | Performed by: NURSE PRACTITIONER

## 2025-07-11 PROCEDURE — 85025 COMPLETE CBC W/AUTO DIFF WBC: CPT | Performed by: NURSE PRACTITIONER

## 2025-07-11 PROCEDURE — 80307 DRUG TEST PRSMV CHEM ANLYZR: CPT | Performed by: NURSE PRACTITIONER

## 2025-07-11 PROCEDURE — 25000003 PHARM REV CODE 250: Performed by: NURSE PRACTITIONER

## 2025-07-11 PROCEDURE — 96360 HYDRATION IV INFUSION INIT: CPT

## 2025-07-11 PROCEDURE — 83735 ASSAY OF MAGNESIUM: CPT | Performed by: NURSE PRACTITIONER

## 2025-07-11 PROCEDURE — 80053 COMPREHEN METABOLIC PANEL: CPT | Performed by: NURSE PRACTITIONER

## 2025-07-11 PROCEDURE — 82962 GLUCOSE BLOOD TEST: CPT

## 2025-07-11 PROCEDURE — 84100 ASSAY OF PHOSPHORUS: CPT | Performed by: NURSE PRACTITIONER

## 2025-07-11 RX ORDER — SODIUM,POTASSIUM PHOSPHATES 280-250MG
1 POWDER IN PACKET (EA) ORAL
Status: COMPLETED | OUTPATIENT
Start: 2025-07-11 | End: 2025-07-11

## 2025-07-11 RX ADMIN — POTASSIUM & SODIUM PHOSPHATES POWDER PACK 280-160-250 MG 1 PACKET: 280-160-250 PACK at 01:07

## 2025-07-11 RX ADMIN — POTASSIUM & SODIUM PHOSPHATES POWDER PACK 280-160-250 MG 1 PACKET: 280-160-250 PACK at 02:07

## 2025-07-11 RX ADMIN — SODIUM CHLORIDE 1000 ML: 9 INJECTION, SOLUTION INTRAVENOUS at 11:07

## 2025-07-11 NOTE — DISCHARGE INSTRUCTIONS
Contact your PCP this week to arrange follow up.    Present to the nearest emergency room immediately if you develop worsening chest pain, shortness of breath, or fever.    Avoid use of oral antacids, which includes Rolaids/Tums.  Take medication as prescribed.

## 2025-07-11 NOTE — ED PROVIDER NOTES
"Encounter Date: 7/11/2025       History     Chief Complaint   Patient presents with    Chest Pain     Left sided chest pain x1. Left arm numbness. Right leg numbness. Patient reports [facility] ER visit on 7/8/25.     Weakness     Generalized weakness.     Shortness of Breath     Started today at work, that is when he decided to come to ER today.      Patient is a 34-year-old male presents for evaluation after experiencing intermittent episodes of chest pain for the last week.  Initially seen at another emergency room on 7/7/25 for same issue.  Reports workup at that time it showed no "problems with my heart." The patient denies trauma to chest, cough, abdominal pain, or loss of bowel or bladder control.  Admits intermittent episodes of weakness as well as "tingling" in the fingers of both of his hands as well.  Patient is currently works at a local [employer] which requires a lot of upper extremity usage.  He also reports extended time outside over the last week.      Review of patient's allergies indicates:  No Known Allergies  No past medical history on file.  Past Surgical History:   Procedure Laterality Date    EPIDURAL STEROID INJECTION INTO LUMBAR SPINE      FUSION OF SACROILIAC JOINT Right 4/13/2021    Procedure: FUSION, SACROILIAC JOINT;  Surgeon: Thomas Quiles Jr., MD;  Location: [facility] OR;  Service: Orthopedics;  Laterality: Right;  7:30 per [place]    HERNIA REPAIR       Family History   Problem Relation Name Age of Onset    Diabetes Mother      Hypertension Mother      Hypertension Father       Social History[1]  Review of Systems   Constitutional:  Negative for chills, diaphoresis, fatigue and fever.   HENT:  Negative for facial swelling, postnasal drip, rhinorrhea, sinus pressure, sinus pain, sore throat and trouble swallowing.    Respiratory:  Negative for cough, chest tightness, shortness of breath and wheezing.    Cardiovascular:  Positive for chest pain. Negative for palpitations and leg swelling. "   Gastrointestinal:  Negative for abdominal pain, diarrhea, nausea and vomiting.   Genitourinary:  Negative for dysuria, flank pain, hematuria and urgency.   Musculoskeletal:  Negative for arthralgias, back pain and myalgias.   Skin:  Negative for color change and rash.   Neurological:  Positive for numbness. Negative for dizziness, syncope, weakness and headaches.   Hematological:  Does not bruise/bleed easily.   All other systems reviewed and are negative.      Physical Exam     Initial Vitals [07/11/25 1057]   BP Pulse Resp Temp SpO2   (!) 136/90 75 (!) 28 97.3 °F (36.3 °C) 100 %      MAP       --         Physical Exam    Nursing note and vitals reviewed.  Constitutional: Vital signs are normal. He appears well-developed and well-nourished.   HENT:   Head: Normocephalic.   Nose: Nose normal. Mouth/Throat: Oropharynx is clear and moist.   Eyes: Conjunctivae and EOM are normal. Pupils are equal, round, and reactive to light.   Neck: Neck supple.   Normal range of motion.  Cardiovascular:  Normal rate, regular rhythm, normal heart sounds and intact distal pulses.           Pulmonary/Chest: Effort normal and breath sounds normal. No respiratory distress. He has no wheezes. He has no rhonchi. He has no rales. He exhibits tenderness. He exhibits no deformity, no swelling and no retraction.     Abdominal: Abdomen is soft and flat. Bowel sounds are normal. There is no abdominal tenderness. There is no rebound, no guarding, no tenderness at McBurney's point and negative Eller's sign.   Musculoskeletal:         General: Normal range of motion.      Cervical back: Normal range of motion and neck supple.     Neurological: He is alert and oriented to person, place, and time. He has normal strength.   Skin: Skin is warm and dry. Capillary refill takes less than 2 seconds.   Psychiatric: He has a normal mood and affect. His behavior is normal. Judgment and thought content normal.         ED Course   Procedures  Labs Reviewed    COMPREHENSIVE METABOLIC PANEL - Abnormal       Result Value    Sodium 140      Potassium 3.6      Chloride 109 (*)     CO2 22      Glucose 94      Blood Urea Nitrogen 9.2      Creatinine 0.90      Calcium 9.3      Protein Total 7.4      Albumin 4.0      Globulin 3.4      Albumin/Globulin Ratio 1.2      Bilirubin Total 0.4            ALT 12      AST 16      eGFR >60      Anion Gap 9.0      BUN/Creatinine Ratio 10     PHOSPHORUS - Abnormal    Phosphorus Level <0.7 (*)    CBC WITH DIFFERENTIAL - Abnormal    WBC 7.79      RBC 4.86      Hgb 14.1      Hct 41.4 (*)     MCV 85.2      MCH 29.0      MCHC 34.1      RDW 13.1      Platelet 240      MPV 11.4 (*)     Neut % 56.5      Lymph % 33.1      Mono % 8.0      Eos % 1.5      Basophil % 0.6      Imm Grans % 0.3      Neut # 4.40      Lymph # 2.58      Mono # 0.62      Eos # 0.12      Baso # 0.05      Imm Gran # 0.02      NRBC% 0.0     PHOSPHORUS - Abnormal    Phosphorus Level 1.3 (*)    TROPONIN I - Normal    Troponin-I <0.010     MAGNESIUM - Normal    Magnesium Level 1.90     DRUG SCREEN, URINE (BEAKER) - Normal    Amphetamines, Urine Negative      Barbiturates, Urine Negative      Benzodiazepine, Urine Negative      Cannabinoids, Urine Negative      Cocaine, Urine Negative      Fentanyl, Urine Negative      MDMA, Urine Negative      Opiates, Urine Negative      Phencyclidine, Urine Negative      pH, Urine 7.0      Specific Gravity, Urine Auto 1.008      Narrative:     Cut off concentrations:    Amphetamines - 1000 ng/ml  Barbiturates - 200 ng/ml  Benzodiazepine - 200 ng/ml  Cannabinoids (THC) - 50 ng/ml  Cocaine - 300 ng/ml  Fentanyl - 1.0 ng/ml  MDMA - 500 ng/ml  Opiates - 300 ng/ml   Phencyclidine (PCP) - 25 ng/ml    Specimen submitted for drug analysis and tested for pH and specific gravity in order to evaluate sample integrity. Suspect tampering if specific gravity is <1.003 and/or pH is not within the range of 4.5 - 8.0  False negatives may result form  substances such as bleach added to urine.  False positives may result for the presence of a substance with similar chemical structure to the drug or its metabolite.    This test provides only a PRELIMINARY analytical test result. A more specific alternate chemical method must be used in order to obtain a confirmed analytical result. Gas chromatography/mass spectrometry (GC/MS) is the preferred confirmatory method. Other chemical confirmation methods are available. Clinical consideration and professional judgement should be applied to any drug of abuse test result, particularly when preliminary positive results are used.    Positive results will be confirmed only at the physicians request. Unconfirmed screening results are to be used only for medical purposes (treatment).        CBC W/ AUTO DIFFERENTIAL    Narrative:     The following orders were created for panel order CBC auto differential.  Procedure                               Abnormality         Status                     ---------                               -----------         ------                     CBC with Differential[[id number]]       Abnormal            Final result                 Please view results for these tests on the individual orders.   EXTRA TUBES    Narrative:     The following orders were created for panel order EXTRA TUBES.  Procedure                               Abnormality         Status                     ---------                               -----------         ------                     Lavender Top Hold[[id number]]                               Final result               Gold Top Hold[[id number]]                                   Final result                 Please view results for these tests on the individual orders.   LAVENDER TOP HOLD    Extra Tube Hold for add-ons.     GOLD TOP HOLD    Extra Tube Hold for add-ons.     POCT GLUCOSE    POCT Glucose 87       EKG Readings: (Independently Interpreted)   Initial Reading: No  STEMI. Previous EKG Date: 04/01/2021. Rhythm: Normal Sinus Rhythm. Heart Rate: 75. Ectopy: No Ectopy. Conduction: Normal. ST Segments: Normal ST Segments. T Waves: Normal. Clinical Impression: Normal Sinus Rhythm   Normal sinus rhythm     ECG Results              EKG 12-lead (Chest Pain) Age >30 (Final result)        Collection Time Result Time QRS Duration OHS QTC Calculation    07/11/25 10:56:57 07/11/25 11:15:35 90 424                     Final result by Interface, Lab In [facility] (07/11/25 11:15:40)                   Narrative:    Test Reason : R07.9,    Vent. Rate :  75 BPM     Atrial Rate :  75 BPM     P-R Int : 132 ms          QRS Dur :  90 ms      QT Int : 380 ms       P-R-T Axes :  22  40  39 degrees    QTcB Int : 424 ms    Normal sinus rhythm  Normal ECG  When compared with ECG of 01-Apr-2021 13:47,  T wave amplitude has decreased in Inferior leads  Confirmed by Pennie Brito ([id number]) on 7/11/2025 11:15:33 AM    Referred By:            Confirmed By: Pennie Brito                                  Imaging Results              X-Ray Chest 1 View (Final result)  Result time 07/11/25 11:47:31      Final result by Doug Flowers MD (07/11/25 11:47:31)                   Impression:      No acute cardiopulmonary process identified.      Electronically signed by: Doug Flowers  Date:    07/11/2025  Time:    11:47               Narrative:    EXAMINATION:  XR CHEST 1 VIEW    CLINICAL HISTORY:  Chest pain, unspecified    TECHNIQUE:  One view    COMPARISON:  February 25, 2025.    FINDINGS:  Cardiopericardial silhouette is within normal limits.  No acute dense focal or segmental consolidation, congestive process, pleural effusions or pneumothorax.                                       Medications   sodium chloride 0.9% bolus 1,000 mL 1,000 mL (0 mLs Intravenous Stopped 7/11/25 1217)   potassium, sodium phosphates 280-160-250 mg packet 1 packet (1 packet Oral Given 7/11/25 1334)   potassium, sodium phosphates 280-160-250  mg packet 1 packet (1 packet Oral Given 7/11/25 4841)     Medical Decision Making  Differential:   AMI   Muscle strain   Nonspecific chest pain   Electrolyte abnormality    Amount and/or Complexity of Data Reviewed  Labs: ordered.  Radiology: ordered.    Risk  OTC drugs.               ED Course as of 07/11/25 1451   Fri Jul 11, 2025   1214 I have movement informed by ER nursing staff the patient's phosphorus level was reading at less than 0.7.  I have ordered a repeat test to ensure accuracy of result. [JA]   1305 Repeat phosphorus level is improved however it is still lower than normal limits.  I will order the patient a dose of K-Phos at this time and will continue to provide 1 dose Q hour to improve current levels. [JA]   1337 On recheck of the patient, he reports initial symptoms of tingling/numbness are improving after receiving oral medication.  I have discussed with him my plan to keep him in the emergency room as I slowly replace his phosphorus.  The patient has verbalized understanding and agreement.  Patient denies additional needs at this time. [JA]   1450 Given strict ED return precautions. I have spoken with the patient and/or caregivers. I have explained the patient's condition, diagnoses and treatment plan based on the information available to me at this time. I have answered the patient's and/or caregiver's questions and addressed any concerns. The patient and/or caregivers have as good an understanding of the patient's diagnosis, condition and treatment plan as can be expected at this point. The vital signs have been stable. The patient's condition is stable and appropriate for discharge from the emergency department.      The patient will pursue further outpatient evaluation with the primary care physician or other designated or consulting physician as outlined in the discharge instructions. The patient and/or caregivers are agreeable to this plan of care and follow-up instructions have been  explained in detail. The patient and/or caregivers have received these instructions in written format and have expressed an understanding of the discharge instructions. The patient and/or caregivers are aware that any significant change in condition or worsening of symptoms should prompt an immediate return to this or the closest emergency department or a call to 911.   [JA]      ED Course User Index  [JA] Blue Arias Jr., DNP                       This chart is generated using a voice recognition system. Grammatical and content areas may inadvertently be generated in error. Please contact me if you find a perceive any inappropriate information in this chart. Thank you.       Clinical Impression:  Final diagnoses:  [R07.9] Chest pain  [E83.39] Hypophosphatemia (Primary)          ED Disposition Condition    Discharge Stable          ED Prescriptions       Medication Sig Dispense Start Date End Date Auth. Provider    k phos di & mono-sod phos mono (K-PHOS-NEUTRAL) 250 mg Tab Take 1 tablet by mouth Daily. for 7 days 7 tablet 7/11/2025 7/18/2025 Blue Arias Jr., DNP          Follow-up Information       Follow up With Specialties Details Why Contact Info    Rianna Dow, P Family Medicine In 3 days  [address] [facility] [id number] [phone]      [facility] Emergency Medicine In 3 days As needed, If symptoms worsen [address] [facility] [address] [phone]                   [1]   Social History  Tobacco Use    Smoking status: Every Day     Current packs/day: 1.00     Average packs/day: 1 pack/day for 14.5 years (14.5 ttl pk-yrs)     Types: Cigarettes     Start date: 1/15/2011    Smokeless tobacco: Never   Substance Use Topics    Alcohol use: Not Currently     Comment: rare    Drug use: Never        Blue Arias Jr., DNP  07/11/25 5748

## 2025-07-30 ENCOUNTER — LAB VISIT (OUTPATIENT)
Dept: LAB | Facility: HOSPITAL | Age: 35
End: 2025-07-30
Attending: NURSE PRACTITIONER
Payer: MEDICAID

## 2025-07-30 DIAGNOSIS — E78.5 HYPERLIPIDEMIA, UNSPECIFIED HYPERLIPIDEMIA TYPE: ICD-10-CM

## 2025-07-30 DIAGNOSIS — E55.9 VITAMIN D DEFICIENCY: ICD-10-CM

## 2025-07-30 LAB
25(OH)D3+25(OH)D2 SERPL-MCNC: 38 NG/ML (ref 30–80)
ALBUMIN SERPL-MCNC: 3.9 G/DL (ref 3.5–5)
ALBUMIN/GLOB SERPL: 1 RATIO (ref 1.1–2)
ALP SERPL-CCNC: 100 UNIT/L (ref 40–150)
ALT SERPL-CCNC: 13 UNIT/L (ref 0–55)
ANION GAP SERPL CALC-SCNC: 6 MEQ/L
AST SERPL-CCNC: 17 UNIT/L (ref 11–45)
BASOPHILS # BLD AUTO: 0.08 X10(3)/MCL
BASOPHILS NFR BLD AUTO: 1 %
BILIRUB SERPL-MCNC: 0.8 MG/DL
BUN SERPL-MCNC: 8.2 MG/DL (ref 8.9–20.6)
CALCIUM SERPL-MCNC: 9.2 MG/DL (ref 8.4–10.2)
CHLORIDE SERPL-SCNC: 106 MMOL/L (ref 98–107)
CHOLEST SERPL-MCNC: 209 MG/DL
CHOLEST/HDLC SERPL: 6 {RATIO} (ref 0–5)
CO2 SERPL-SCNC: 27 MMOL/L (ref 22–29)
CREAT SERPL-MCNC: 0.89 MG/DL (ref 0.72–1.25)
CREAT/UREA NIT SERPL: 9
EOSINOPHIL # BLD AUTO: 0.23 X10(3)/MCL (ref 0–0.9)
EOSINOPHIL NFR BLD AUTO: 3 %
ERYTHROCYTE [DISTWIDTH] IN BLOOD BY AUTOMATED COUNT: 13.2 % (ref 11.5–17)
GFR SERPLBLD CREATININE-BSD FMLA CKD-EPI: >60 ML/MIN/1.73/M2
GLOBULIN SER-MCNC: 3.8 GM/DL (ref 2.4–3.5)
GLUCOSE SERPL-MCNC: 91 MG/DL (ref 74–100)
HCT VFR BLD AUTO: 45.4 % (ref 42–52)
HDLC SERPL-MCNC: 34 MG/DL (ref 35–60)
HGB BLD-MCNC: 14.6 G/DL (ref 14–18)
IMM GRANULOCYTES # BLD AUTO: 0.04 X10(3)/MCL (ref 0–0.04)
IMM GRANULOCYTES NFR BLD AUTO: 0.5 %
LDLC SERPL CALC-MCNC: 153 MG/DL (ref 50–140)
LYMPHOCYTES # BLD AUTO: 2.86 X10(3)/MCL (ref 0.6–4.6)
LYMPHOCYTES NFR BLD AUTO: 37.1 %
MCH RBC QN AUTO: 28.4 PG (ref 27–31)
MCHC RBC AUTO-ENTMCNC: 32.2 G/DL (ref 33–36)
MCV RBC AUTO: 88.3 FL (ref 80–94)
MONOCYTES # BLD AUTO: 0.71 X10(3)/MCL (ref 0.1–1.3)
MONOCYTES NFR BLD AUTO: 9.2 %
NEUTROPHILS # BLD AUTO: 3.79 X10(3)/MCL (ref 2.1–9.2)
NEUTROPHILS NFR BLD AUTO: 49.2 %
NRBC BLD AUTO-RTO: 0 %
PLATELET # BLD AUTO: 262 X10(3)/MCL (ref 130–400)
PMV BLD AUTO: 11.7 FL (ref 7.4–10.4)
POTASSIUM SERPL-SCNC: 4.6 MMOL/L (ref 3.5–5.1)
PROT SERPL-MCNC: 7.7 GM/DL (ref 6.4–8.3)
RBC # BLD AUTO: 5.14 X10(6)/MCL (ref 4.7–6.1)
SODIUM SERPL-SCNC: 139 MMOL/L (ref 136–145)
TRIGL SERPL-MCNC: 112 MG/DL (ref 34–140)
VLDLC SERPL CALC-MCNC: 22 MG/DL
WBC # BLD AUTO: 7.71 X10(3)/MCL (ref 4.5–11.5)

## 2025-07-30 PROCEDURE — 36415 COLL VENOUS BLD VENIPUNCTURE: CPT

## 2025-07-30 PROCEDURE — 85025 COMPLETE CBC W/AUTO DIFF WBC: CPT

## 2025-07-30 PROCEDURE — 80061 LIPID PANEL: CPT

## 2025-07-30 PROCEDURE — 80053 COMPREHEN METABOLIC PANEL: CPT

## 2025-07-30 PROCEDURE — 82306 VITAMIN D 25 HYDROXY: CPT

## 2025-08-06 DIAGNOSIS — R06.02 SOB (SHORTNESS OF BREATH): ICD-10-CM

## 2025-08-06 DIAGNOSIS — R07.9 CHEST PAIN: Primary | ICD-10-CM

## 2025-08-07 ENCOUNTER — OFFICE VISIT (OUTPATIENT)
Dept: INTERNAL MEDICINE | Facility: CLINIC | Age: 35
End: 2025-08-07
Payer: MEDICAID

## 2025-08-07 VITALS
WEIGHT: 194 LBS | RESPIRATION RATE: 18 BRPM | TEMPERATURE: 98 F | SYSTOLIC BLOOD PRESSURE: 110 MMHG | BODY MASS INDEX: 25.71 KG/M2 | HEIGHT: 73 IN | HEART RATE: 80 BPM | DIASTOLIC BLOOD PRESSURE: 68 MMHG

## 2025-08-07 DIAGNOSIS — M51.16 LUMBAR DISC DISEASE WITH RADICULOPATHY: ICD-10-CM

## 2025-08-07 DIAGNOSIS — G89.29 CHRONIC LOW BACK PAIN, UNSPECIFIED BACK PAIN LATERALITY, UNSPECIFIED WHETHER SCIATICA PRESENT: ICD-10-CM

## 2025-08-07 DIAGNOSIS — E78.5 HYPERLIPIDEMIA, UNSPECIFIED HYPERLIPIDEMIA TYPE: ICD-10-CM

## 2025-08-07 DIAGNOSIS — F17.210 CIGARETTE NICOTINE DEPENDENCE WITHOUT COMPLICATION: ICD-10-CM

## 2025-08-07 DIAGNOSIS — E55.9 VITAMIN D DEFICIENCY: ICD-10-CM

## 2025-08-07 DIAGNOSIS — M54.50 CHRONIC LOW BACK PAIN, UNSPECIFIED BACK PAIN LATERALITY, UNSPECIFIED WHETHER SCIATICA PRESENT: ICD-10-CM

## 2025-08-07 DIAGNOSIS — Z00.00 WELLNESS EXAMINATION: Primary | ICD-10-CM

## 2025-08-07 PROCEDURE — 99214 OFFICE O/P EST MOD 30 MIN: CPT | Mod: PBBFAC | Performed by: NURSE PRACTITIONER

## 2025-08-07 RX ORDER — DICLOFENAC SODIUM 75 MG/1
75 TABLET, DELAYED RELEASE ORAL 2 TIMES DAILY PRN
COMMUNITY
End: 2025-08-07 | Stop reason: SDUPTHER

## 2025-08-07 RX ORDER — GABAPENTIN 300 MG/1
300 CAPSULE ORAL NIGHTLY PRN
Qty: 30 CAPSULE | Refills: 5 | Status: SHIPPED | OUTPATIENT
Start: 2025-08-07 | End: 2026-08-07

## 2025-08-07 RX ORDER — DICLOFENAC SODIUM 75 MG/1
75 TABLET, DELAYED RELEASE ORAL 2 TIMES DAILY PRN
Qty: 60 TABLET | Refills: 5 | Status: SHIPPED | OUTPATIENT
Start: 2025-08-07

## 2025-08-07 NOTE — PROGRESS NOTES
[facility]    Patient ID: [id number]     Chief Complaint: Chronic low back pain (Lab review)      HPI:     Rajan Ayala is a 34 y.o. male here today for a follow up.     History of Present Illness    HPI:  Patient reports severe and persistent lower back pain located in the L4-L5 region, just above the tailbone. The pain radiates to the left leg with numbness. Patient also reports involuntary leg movements, particularly when lying down or feeling drowsy. These symptoms primarily occur at rest or when preparing to sleep.    The back pain significantly impacts his daily life, limiting his ability to sit or stand for extended periods. Sleep is particularly affected, with frequent disturbances. To find a comfortable sleeping position, he has to lie on his abdomen and bend one leg in a specific way or curl up.    Patient has previously undergone physical therapy for his back pain. He is currently taking diclofenac 75mg twice daily as needed for pain management, but reports minimal efficacy. He also has a prescription for tizanidine (a muscle relaxer) which he has been taking daily, but feels it does not work effectively.    In the past, patient received injections for his SI joint before undergoing hip surgery, but these were not effective. He has not had injections specifically in his spine. An MRI from September 2024 showed lumbar degenerative disc disease and spondylosis, with bulging of the annulus fibrosis at L4-L5, which slightly flattens the ventral thecal sac. The MRI also revealed bilateral minimal facet arthropathy and significant narrowing of the neural foramen at this level.    Denies chest pain, shortness of breath, cough, fever, headache, dizziness, weakness, abdominal pain, nausea, vomiting, diarrhea, constipation, dysuria, depression, anxiety.     TEST RESULTS:  Recent lab results show a good cholesterol level of 38. Patient's kidney and liver function tests were normal. A recent CBC was  also normal, indicating no infection or anemia.    IMAGING:  An MRI of the Lumbar Spine was conducted in September 2024. The results revealed bulging of the annulus fibrosis at L4-L5, slightly flattening the ventral thecal sac. The central canal was not stenosed. Bilateral minimal facet arthropathy and significant narrowing of the neural foramen were noted. The L5-S1 disc was unremarkable. The MRI also indicated lumbar degenerative disc disease and spondylosis.          Patient Active Problem List   Diagnosis    Sacroiliitis        Past Surgical History:   Procedure Laterality Date    EPIDURAL STEROID INJECTION INTO LUMBAR SPINE      FUSION OF SACROILIAC JOINT Right 4/13/2021    Procedure: FUSION, SACROILIAC JOINT;  Surgeon: Thomas Quiles Jr., MD;  Location: [facility] OR;  Service: Orthopedics;  Laterality: Right;  7:30 per Elroy    HERNIA REPAIR          Social History     Tobacco Use    Smoking status: Every Day     Current packs/day: 1.00     Average packs/day: 1 pack/day for 14.6 years (14.6 ttl pk-yrs)     Types: Cigarettes     Start date: 1/15/2011    Smokeless tobacco: Never   Substance and Sexual Activity    Alcohol use: Not Currently     Comment: rare    Drug use: Never    Sexual activity: Not on file        Current Outpatient Medications   Medication Instructions    cholecalciferol (vitamin D3) (VITAMIN D3) 2,000 Units, Oral, Daily    diclofenac (VOLTAREN) 75 mg, Oral, 2 times daily PRN    gabapentin (NEURONTIN) 300 mg, Oral, Nightly PRN    k phos di & mono-sod phos mono (K-PHOS-NEUTRAL) 250 mg Tab 1 tablet, Oral, Daily    methylPREDNISolone (MEDROL DOSEPACK) 4 mg tablet use as directed    tiZANidine (ZANAFLEX) 2 mg, Oral, Every 12 hours PRN       Review of patient's allergies indicates:  No Known Allergies     Patient Care Team:  Rianna Dow FNP as PCP - General (Family Medicine)     Subjective:     Review of Systems    12 point review of systems conducted, negative except as stated in the  "history of present illness. See HPI for details.    Objective:     BP Readings from Last 6 Encounters:   08/07/25 110/68   07/11/25 122/81   02/25/25 127/81   02/06/25 112/73   09/10/24 111/72   06/06/24 109/71     Pulse Readings from Last 6 Encounters:   08/07/25 80   07/11/25 60   02/25/25 82   02/06/25 64   09/10/24 62   06/06/24 69     Wt Readings from Last 6 Encounters:   08/07/25 88 kg (194 lb)   07/11/25 92 kg (202 lb 14.4 oz)   02/25/25 89.4 kg (197 lb 3.2 oz)   02/06/25 88.5 kg (195 lb)   09/10/24 89.8 kg (198 lb)   06/06/24 86.7 kg (191 lb 2.2 oz)      BMI Readings from Last 6 Encounters:   08/07/25 25.60 kg/m²   07/11/25 26.77 kg/m²   02/25/25 26.02 kg/m²   02/06/25 25.73 kg/m²   09/10/24 26.12 kg/m²   06/06/24 25.22 kg/m²        Visit Vitals  /68 (BP Location: Left arm, Patient Position: Sitting)   Pulse 80   Temp 98.1 °F (36.7 °C) (Oral)   Resp 18   Ht 6' 1" (1.854 m)   Wt 88 kg (194 lb)   BMI 25.60 kg/m²       Physical Exam    Physical Exam    General: No acute distress. Well-developed. Well-nourished.  Eyes: EOMI. Sclerae anicteric.  HENT: Normocephalic. Atraumatic. Nares patent. Moist oral mucosa.  Ears: Bilateral TMs clear. Bilateral EACs clear.  Cardiovascular: Regular rate. Regular rhythm. No murmurs. No rubs. No gallops. Normal S1, S2.  Respiratory: Normal respiratory effort. Clear to auscultation bilaterally. No rales. No rhonchi. No wheezing.  Abdomen: Soft. Non-tender. Non-distended. Normoactive bowel sounds.  Musculoskeletal: No  obvious deformity.  Extremities: No lower extremity edema.  Neurological: Alert & oriented x3. No slurred speech. Normal gait.  Psychiatric: Normal mood. Normal affect. Good insight. Good judgment.  Skin: Warm. Dry. No rash.         Recent Results:     Chemistry:  Lab Results   Component Value Date     07/30/2025    K 4.6 07/30/2025    BUN 8.2 (L) 07/30/2025    CREATININE 0.89 07/30/2025    EGFRNORACEVR >60 07/30/2025    CALCIUM 9.2 07/30/2025    ALKPHOS " 100 07/30/2025    ALBUMIN 3.9 07/30/2025    BILIDIR 0.10 05/03/2019    IBILI 0.80 05/03/2019    AST 17 07/30/2025    ALT 13 07/30/2025    MG 1.90 07/11/2025    PHOS 1.3 (L) 07/11/2025    CSLPEIGB88AK 38 07/30/2025    TSH 0.930 05/09/2024        Lab Results   Component Value Date    HGBA1C 5.3 09/10/2024        Hematology:  Lab Results   Component Value Date    WBC 7.71 07/30/2025    HGB 14.6 07/30/2025    HCT 45.4 07/30/2025     07/30/2025       Lipid Panel:  Lab Results   Component Value Date    CHOL 209 (H) 07/30/2025    HDL 34 (L) 07/30/2025    TRIG 112 07/30/2025    TOTALCHOLEST 6 (H) 07/30/2025    LDLCALC 153.00 (H) 07/30/2025        Urine:  Lab Results   Component Value Date    APPEARANCEUA Clear 09/10/2024    SGUA 1.015 09/10/2024    PROTEINUA Negative 09/10/2024    KETONESUA Negative 09/10/2024    LEUKOCYTESUR Negative 09/10/2024    RBCUA 6-10 (A) 09/10/2024    WBCUA 0-5 09/10/2024    BACTERIA None Seen 09/10/2024    SQEPUA None Seen 09/10/2024    HYALINECASTS None Seen 09/10/2024        Assessment/Plan:     Assessment & Plan    PLAN SUMMARY:  - MRI results reviewed: lumbar degenerative disc disease and spondylosis noted  - Prescribed gabapentin as needed at night for neuropathic symptoms  - Recommend diclofenac 75 mg twice daily as maintenance  - Referred to Dr. Moss for pain management and potential interventional therapies  - Counseled on potential medication side effects and smoking cessation  - Advised to try different sleeping positions for back pain relief  - Follow up in 6 months  - Contact office if needed before next appointment    ##HLD    Lab Results   Component Value Date    CHOL 209 (H) 07/30/2025    HDL 34 (L) 07/30/2025    TRIG 112 07/30/2025       Not at goal. Take OTC Fish oil/[place] 3/DHA EPA daily.   Stressed importance of dietary modifications. Follow a low cholesterol, low saturated fat diet with less that 200mg of cholesterol a day.  Avoid fried foods and high saturated fats  (high saturated fats less than 7% of calories).  Add Flax Seed/Fish Oil supplements to diet. Increase dietary fiber.  Regular exercise can reduce LDL and raise HDL. Stressed importance of physical activity 5 times per week for 30 minutes per day.      ## LUMBAR INTERVERTEBRAL DISC DISORDERS:  - MRI shows bulging of the annulus fibrosis at L4-L5, slightly flattening the ventral thecal sac, with bilateral minimal facet arthropathy and significant narrowing of the neural foramen.  - Patient reports lower back pain, especially when sitting or standing for long periods, with tenderness on palpation.  - Discussed MRI results showing lumbar degenerative disc disease and spondylosis.  - Recommend taking diclofenac 75 mg twice daily more consistently as maintenance rather than as needed.  - Patient should try different sleeping positions to alleviate back pain.  Continue/start RX  Lower back stretches daily.  Avoid strenuous lifting, use proper body mechanics.  Heating pad, Ice pack, Biofreeze or Epsom salt baths as needed.  Exercise to strengthen core muscles to support your back.  Consider restarting physical therapy (PT)     ## LUMBAR RADICULOPATHY:  - Patient experiences pain radiating down the left leg, with numbness occurring when in supine position.  - Evaluated leg pain and numbness, especially when lying down or feeling drowsy.  - Discussed symptoms of radiculopathy with the patient.    ## ABNORMAL INVOLUNTARY MOVEMENTS:  - Patient experiences involuntary leg movements, especially when lying down and feeling drowsy.  - These kicking sensations occur primarily when in supine position.    ## PARESTHESIA OF SKIN:  - Patient experiences numbness in the left leg, particularly when lying down.    ## NICOTINE DEPENDENCE:  - Confirmed the patient's ongoing smoking habit, indicating continued nicotine dependence.  Smoking cessation advised. Instructed on smoking cessation program through [facility] and pharmacological interventions  to aid in cessation.  >5 minutes allotted to educate patient on the harms of smoking, the urgency to quit, and the development of a plan for smoking cessation.     ## PAIN MANAGEMENT REFERRAL:  - Referred the patient to Dr. Moss for pain management for potential interventional therapies given persistent symptoms affecting sleep and daily activities.  - Prescribed gabapentin as needed at night for neuropathic symptoms (numbness, leg pain, and kicking sensations) while awaiting pain management consultation.  - Counseled the patient about potential side effects including drowsiness and weight gain.    ## GENERAL HEALTH MANAGEMENT:  - Reviewed recent lab results: cholesterol 38, normal kidney and liver function, normal CBC without anemia or infection.  - Pneumonia vaccine administered in office.    ## FOLLOW-UP:  - Follow up in 6 months.  - Contact office if needed before next appointment.          Orders Placed This Encounter   Procedures    Vitamin D    TSH    Lipid Panel    Comprehensive Metabolic Panel    CBC Auto Differential    Urinalysis, Reflex to Urine Culture    Ambulatory referral/consult to Pain Clinic        Follow up in about 6 months (around 2/7/2026) for fasting labs. In addition to their scheduled follow up, the patient has also been instructed to follow up on as needed basis.     Future Appointments   Date Time Provider Department Center   2/11/2026  8:40 AM Rianna Dow FNP [facility] [place]        GLEND[place]    This note was generated with the assistance of ambient listening technology. Verbal consent was obtained by the patient and accompanying visitor(s) for the recording of patient appointment to facilitate this note. I attest to having reviewed and edited the generated note for accuracy, though some syntax or spelling errors may persist. Please contact the author of this note for any clarification.